# Patient Record
Sex: FEMALE | Race: WHITE | NOT HISPANIC OR LATINO | Employment: FULL TIME | URBAN - METROPOLITAN AREA
[De-identification: names, ages, dates, MRNs, and addresses within clinical notes are randomized per-mention and may not be internally consistent; named-entity substitution may affect disease eponyms.]

---

## 2017-04-14 ENCOUNTER — OFFICE VISIT (OUTPATIENT)
Dept: CARDIOLOGY | Facility: CLINIC | Age: 57
End: 2017-04-14

## 2017-04-14 VITALS
HEIGHT: 65 IN | DIASTOLIC BLOOD PRESSURE: 84 MMHG | OXYGEN SATURATION: 99 % | WEIGHT: 148 LBS | HEART RATE: 65 BPM | BODY MASS INDEX: 24.66 KG/M2 | SYSTOLIC BLOOD PRESSURE: 128 MMHG

## 2017-04-14 DIAGNOSIS — E78.5 HYPERLIPIDEMIA, UNSPECIFIED HYPERLIPIDEMIA TYPE: ICD-10-CM

## 2017-04-14 DIAGNOSIS — Z95.5 HISTORY OF CORONARY ARTERY STENT PLACEMENT: ICD-10-CM

## 2017-04-14 DIAGNOSIS — Z72.0 TOBACCO ABUSE: ICD-10-CM

## 2017-04-14 DIAGNOSIS — R01.1 HEART MURMUR: ICD-10-CM

## 2017-04-14 DIAGNOSIS — I25.10 CORONARY ARTERY DISEASE INVOLVING NATIVE CORONARY ARTERY OF NATIVE HEART WITHOUT ANGINA PECTORIS: Primary | ICD-10-CM

## 2017-04-14 PROCEDURE — 99214 OFFICE O/P EST MOD 30 MIN: CPT | Performed by: PHYSICIAN ASSISTANT

## 2017-04-14 PROCEDURE — 93000 ELECTROCARDIOGRAM COMPLETE: CPT | Performed by: PHYSICIAN ASSISTANT

## 2017-04-14 NOTE — PROGRESS NOTES
Date of Office Visit: 2017  Encounter Provider: BUCKY Mcdonald  Place of Service: [facility] CARDIOLOGY  Patient Name: Chiquita Hebert  :1960    Chief Complaint   Patient presents with   • Coronary Artery Disease     1 year follow up   :     HPI: Chiquita Hebert is a 56 y.o. female , new to me, who presents today for follow-up.  Old records have been obtained and reviewed by me.  She is a patient of Dr. Dodd's with a past medical history significant for coronary artery disease who is status post stenting of her proximal LAD.  She was last in our office to see Dr. Dodd on 2016.  Unfortunately, she was continuing to smoke at that visit.  She was still working for [employer] and was doing a lot of physical activity as part of her job.  She had no complaints of angina or heart failure.  No changes were made to her medical regimen at that visit.  Dr. Dodd recommended that she follow-up with her primary care physician about smoking cessation.  She is here today for her yearly follow-up.   Over the past year, she's been doing well.  She still works at [employer] and has no difficulty or physical limitations.  She does notice some shortness of breath on occasion as well as some palpitations.  This is unchanged for her.  Unfortunately, she is still smoking.  She used Chantix in the past, however she has been hesitant to restart it because it made her nauseous.        Past Medical History:   Diagnosis Date   • Anxiety    • Chest pain    • Constipation    • Fatigue    • Gastritis    • GERD (gastroesophageal reflux disease)    • H/O esophagogastroduodenoscopy 2010    Diagnostic    • Heart disease    • Heart murmur    • Heart palpitations    • Hyperlipidemia    • Hypertension        Past Surgical History:   Procedure Laterality Date   • ANGIOPLASTY      Cath Stent Placement.  Stenting of the LAD   • CARDIAC CATHETERIZATION     • CORONARY ANGIOPLASTY     • CORONARY STENT PLACEMENT      • UPPER GASTROINTESTINAL ENDOSCOPY         Social History     Social History   • Marital status: Single     Spouse name: N/A   • Number of children: N/A   • Years of education: N/A     Occupational History   • Not on file.     Social History Main Topics   • Smoking status: Current Every Day Smoker     Packs/day: 1.00   • Smokeless tobacco: Not on file   • Alcohol use 0.6 oz/week     1 Shots of liquor per week      Comment: OCCASIONAL   • Drug use: No   • Sexual activity: Defer     Other Topics Concern   • Not on file     Social History Narrative       Family History   Problem Relation Age of Onset   • Hypertension Mother    • Stroke Mother    • Thyroid disease Mother    • Heart disease Father    • Hypertension Father    • Cancer Sister    • Diabetes Sister    • No Known Problems Maternal Grandmother    • No Known Problems Maternal Grandfather    • No Known Problems Paternal Grandmother    • No Known Problems Paternal Grandfather    • Diabetes Sister    • No Known Problems Sister    • No Known Problems Sister        Review of Systems   Constitution: Negative for chills, fever, malaise/fatigue, weight gain and weight loss.   HENT: Negative for ear pain, headaches, hearing loss, nosebleeds and sore throat.    Eyes: Negative for double vision, pain and visual disturbance.   Cardiovascular: Positive for dyspnea on exertion and palpitations. Negative for chest pain, irregular heartbeat, leg swelling, near-syncope, orthopnea, paroxysmal nocturnal dyspnea and syncope.   Respiratory: Negative for cough, shortness of breath, sleep disturbances due to breathing, snoring and wheezing.    Endocrine: Negative for cold intolerance, heat intolerance and polyuria.   Skin: Negative for itching and rash.   Musculoskeletal: Negative for joint pain, joint swelling and myalgias.   Gastrointestinal: Negative for abdominal pain, diarrhea, melena, nausea and vomiting.   Genitourinary: Negative for frequency, hematuria and hesitancy.  "  Neurological: Negative for excessive daytime sleepiness, light-headedness, numbness, paresthesias and seizures.   Psychiatric/Behavioral: Negative for altered mental status and depression.   Allergic/Immunologic: Negative.    All other systems reviewed and are negative.      Allergies   Allergen Reactions   • Penicillins          Current Outpatient Prescriptions:   •  aspirin 81 MG tablet, Take 1 tablet by mouth daily., Disp: , Rfl:   •  clopidogrel (PLAVIX) 75 MG tablet, TAKE ONE TABLET BY MOUTH ONCE DAILY, Disp: 90 tablet, Rfl: 3  •  escitalopram (LEXAPRO) 20 MG tablet, Take 20 mg by mouth Daily., Disp: , Rfl:   •  gabapentin (NEURONTIN) 400 MG capsule, Take 1 capsule by mouth daily., Disp: , Rfl:   •  lisinopril (PRINIVIL,ZESTRIL) 5 MG tablet, Take 1 tablet by mouth daily., Disp: , Rfl:   •  magnesium oxide (MAG-OX) 400 MG tablet, Take 400 mg by mouth daily., Disp: , Rfl:   •  omeprazole (PriLOSEC) 20 MG capsule, Take 1 capsule by mouth daily., Disp: , Rfl:   •  polyethylene glycol (MIRALAX) packet, Take 17 g by mouth daily., Disp: 30 packet, Rfl: 3  •  simvastatin (ZOCOR) 40 MG tablet, Take 1 tablet by mouth daily., Disp: , Rfl:   •  vitamin D (ERGOCALCIFEROL) 56730 UNITS capsule capsule, 50,000 Units Every 7 (Seven) Days., Disp: , Rfl:      Objective:     Vitals:    04/14/17 1127 04/14/17 1128   BP: 126/82 128/84   BP Location: Right arm Left arm   Pulse: 65    SpO2: 99%    Weight: 148 lb (67.1 kg)    Height: 65\" (165.1 cm)      Body mass index is 24.63 kg/(m^2).    PHYSICAL EXAM:    Physical Exam   Constitutional: She is oriented to person, place, and time. She appears well-developed and well-nourished. No distress.   HENT:   Head: Normocephalic and atraumatic.   Eyes: Pupils are equal, round, and reactive to light.   Neck: No JVD present. No thyromegaly present.   Cardiovascular: Normal rate and intact distal pulses.  An irregular rhythm present.   Murmur heard.   Harsh midsystolic murmur is present with a " grade of 2/6  at the upper right sternal border radiating to the neck  Pulmonary/Chest: Effort normal and breath sounds normal. No respiratory distress.   Abdominal: Soft. Bowel sounds are normal. She exhibits no distension. There is no splenomegaly or hepatomegaly. There is no tenderness.   Musculoskeletal: Normal range of motion. She exhibits no edema.   Neurological: She is alert and oriented to person, place, and time.   Skin: Skin is warm and dry. She is not diaphoretic. No erythema.   Psychiatric: She has a normal mood and affect. Her behavior is normal. Judgment normal.         ECG 12 Lead  Date/Time: 4/14/2017 12:10 PM  Performed by: SIMEON GABRIEL.  Authorized by: SIMEON GABRIEL.   Comparison: compared with previous ECG from 2/8/2016  Similar to previous ECG  Rhythm: sinus rhythm  Ectopy: atrial premature contractions  BPM: 65  Clinical impression: abnormal ECG  Comments: Indication: Coronary artery disease, status post stent placement.    Ectopic atrial rhythm.              Assessment:       Diagnosis Plan   1. Coronary artery disease involving native coronary artery of native heart without angina pectoris  Adult Transthoracic Echo Complete    ECG 12 Lead   2. History of coronary artery stent placement  Adult Transthoracic Echo Complete    ECG 12 Lead   3. Heart murmur  Adult Transthoracic Echo Complete   4. Tobacco abuse     5. Hyperlipidemia, unspecified hyperlipidemia type       Orders Placed This Encounter   Procedures   • ECG 12 Lead     This order was created via procedure documentation   • Adult Transthoracic Echo Complete     Standing Status:   Future     Order Specific Question:   Reason for exam?     Answer:   Murmur or Click          Plan:       1.  Coronary Artery Disease  Assessment  • The patient has no angina    Plan  • Lifestyle modifications discussed include avoidance of tobacco products and regular exercise    Subjective - Objective  • There has been a previous stent procedure using  VILLA  • Current antiplatelet therapy includes aspirin 81 mg and clopidogrel 75 mg  • Overall she is stable from a cardiac standpoint.  I'm not make any changes to her medical regimen.  We did talk about smoking cessation, and I've encouraged her to follow up with her PCP about trying Chantix again.  I think that if she takes it with food and not on an empty stomach she may not be nauseous.    2.  Heart murmur.  I can't see that she's had an echocardiogram recently, going to check one.    3.  Hyperlipidemia.  She is on Zocor 40 mg, continue current medical regimen.    4.  Hypertension.  Her blood pressure is well-controlled today at 128/84.  Continue current medical regimen.    As always, it has been a pleasure to participate in your patient's care.      Sincerely,         Gavi Alcala PA-C

## 2017-05-01 ENCOUNTER — APPOINTMENT (OUTPATIENT)
Dept: CARDIOLOGY | Facility: HOSPITAL | Age: 57
End: 2017-05-01

## 2017-05-01 ENCOUNTER — HOSPITAL ENCOUNTER (OUTPATIENT)
Dept: CARDIOLOGY | Facility: HOSPITAL | Age: 57
Discharge: HOME | End: 2017-05-01
Admitting: PHYSICIAN ASSISTANT

## 2017-05-01 VITALS
HEIGHT: 65 IN | DIASTOLIC BLOOD PRESSURE: 70 MMHG | HEART RATE: 70 BPM | SYSTOLIC BLOOD PRESSURE: 112 MMHG | WEIGHT: 148 LBS | BODY MASS INDEX: 24.66 KG/M2

## 2017-05-01 DIAGNOSIS — Z95.5 HISTORY OF CORONARY ARTERY STENT PLACEMENT: ICD-10-CM

## 2017-05-01 DIAGNOSIS — R01.1 HEART MURMUR: ICD-10-CM

## 2017-05-01 DIAGNOSIS — I25.10 CORONARY ARTERY DISEASE INVOLVING NATIVE CORONARY ARTERY OF NATIVE HEART WITHOUT ANGINA PECTORIS: ICD-10-CM

## 2017-05-01 LAB
BH CV ECHO MEAS - ACS: 1.6 CM
BH CV ECHO MEAS - AO MAX PG (FULL): 9.4 MMHG
BH CV ECHO MEAS - AO MAX PG: 12.6 MMHG
BH CV ECHO MEAS - AO MEAN PG (FULL): 4.6 MMHG
BH CV ECHO MEAS - AO MEAN PG: 6.4 MMHG
BH CV ECHO MEAS - AO ROOT AREA (BSA CORRECTED): 1.4
BH CV ECHO MEAS - AO ROOT AREA: 4.5 CM^2
BH CV ECHO MEAS - AO ROOT DIAM: 2.4 CM
BH CV ECHO MEAS - AO V2 MAX: 177.8 CM/SEC
BH CV ECHO MEAS - AO V2 MEAN: 117.2 CM/SEC
BH CV ECHO MEAS - AO V2 VTI: 40.9 CM
BH CV ECHO MEAS - AVA(I,A): 1.3 CM^2
BH CV ECHO MEAS - AVA(I,D): 1.3 CM^2
BH CV ECHO MEAS - AVA(V,A): 1.3 CM^2
BH CV ECHO MEAS - AVA(V,D): 1.3 CM^2
BH CV ECHO MEAS - BSA(HAYCOCK): 1.8 M^2
BH CV ECHO MEAS - BSA: 1.7 M^2
BH CV ECHO MEAS - BZI_BMI: 24.6 KILOGRAMS/M^2
BH CV ECHO MEAS - BZI_METRIC_HEIGHT: 165.1 CM
BH CV ECHO MEAS - BZI_METRIC_WEIGHT: 67.1 KG
BH CV ECHO MEAS - CONTRAST EF (2CH): 58.8 ML/M^2
BH CV ECHO MEAS - CONTRAST EF 4CH: 58.9 ML/M^2
BH CV ECHO MEAS - EDV(MOD-SP2): 51 ML
BH CV ECHO MEAS - EDV(MOD-SP4): 56 ML
BH CV ECHO MEAS - EDV(TEICH): 103.6 ML
BH CV ECHO MEAS - EF(CUBED): 76.4 %
BH CV ECHO MEAS - EF(MOD-SP2): 58.8 %
BH CV ECHO MEAS - EF(MOD-SP4): 58.9 %
BH CV ECHO MEAS - EF(TEICH): 68.4 %
BH CV ECHO MEAS - ESV(MOD-SP2): 21 ML
BH CV ECHO MEAS - ESV(MOD-SP4): 23 ML
BH CV ECHO MEAS - ESV(TEICH): 32.7 ML
BH CV ECHO MEAS - FS: 38.2 %
BH CV ECHO MEAS - IVS/LVPW: 1
BH CV ECHO MEAS - IVSD: 1 CM
BH CV ECHO MEAS - LAT PEAK E' VEL: 10 CM/SEC
BH CV ECHO MEAS - LV DIASTOLIC VOL/BSA (35-75): 32.2 ML/M^2
BH CV ECHO MEAS - LV MASS(C)D: 161.2 GRAMS
BH CV ECHO MEAS - LV MASS(C)DI: 92.6 GRAMS/M^2
BH CV ECHO MEAS - LV MAX PG: 3.2 MMHG
BH CV ECHO MEAS - LV MEAN PG: 1.8 MMHG
BH CV ECHO MEAS - LV SYSTOLIC VOL/BSA (12-30): 13.2 ML/M^2
BH CV ECHO MEAS - LV V1 MAX: 89.7 CM/SEC
BH CV ECHO MEAS - LV V1 MEAN: 64.5 CM/SEC
BH CV ECHO MEAS - LV V1 VTI: 20.4 CM
BH CV ECHO MEAS - LVIDD: 4.7 CM
BH CV ECHO MEAS - LVIDS: 2.9 CM
BH CV ECHO MEAS - LVLD AP2: 7.1 CM
BH CV ECHO MEAS - LVLD AP4: 6.8 CM
BH CV ECHO MEAS - LVLS AP2: 6.1 CM
BH CV ECHO MEAS - LVLS AP4: 5.6 CM
BH CV ECHO MEAS - LVOT AREA (M): 2.5 CM^2
BH CV ECHO MEAS - LVOT AREA: 2.5 CM^2
BH CV ECHO MEAS - LVOT DIAM: 1.8 CM
BH CV ECHO MEAS - LVPWD: 0.96 CM
BH CV ECHO MEAS - MED PEAK E' VEL: 8 CM/SEC
BH CV ECHO MEAS - MV A DUR: 0.14 SEC
BH CV ECHO MEAS - MV A MAX VEL: 81 CM/SEC
BH CV ECHO MEAS - MV DEC SLOPE: 368.8 CM/SEC^2
BH CV ECHO MEAS - MV DEC TIME: 0.22 SEC
BH CV ECHO MEAS - MV E MAX VEL: 85.4 CM/SEC
BH CV ECHO MEAS - MV E/A: 1.1
BH CV ECHO MEAS - MV MAX PG: 4.4 MMHG
BH CV ECHO MEAS - MV MEAN PG: 2.3 MMHG
BH CV ECHO MEAS - MV P1/2T MAX VEL: 85.4 CM/SEC
BH CV ECHO MEAS - MV P1/2T: 67.8 MSEC
BH CV ECHO MEAS - MV V2 MAX: 104.5 CM/SEC
BH CV ECHO MEAS - MV V2 MEAN: 71.5 CM/SEC
BH CV ECHO MEAS - MV V2 VTI: 35.3 CM
BH CV ECHO MEAS - MVA P1/2T LCG: 2.6 CM^2
BH CV ECHO MEAS - MVA(P1/2T): 3.2 CM^2
BH CV ECHO MEAS - MVA(VTI): 1.5 CM^2
BH CV ECHO MEAS - PA MAX PG (FULL): 12.2 MMHG
BH CV ECHO MEAS - PA MAX PG: 14.7 MMHG
BH CV ECHO MEAS - PA V2 MAX: 191.4 CM/SEC
BH CV ECHO MEAS - PULM A REVS DUR: 0.09 SEC
BH CV ECHO MEAS - PULM A REVS VEL: 31 CM/SEC
BH CV ECHO MEAS - PULM DIAS VEL: 72.3 CM/SEC
BH CV ECHO MEAS - PULM S/D: 1.1
BH CV ECHO MEAS - PULM SYS VEL: 78.6 CM/SEC
BH CV ECHO MEAS - PVA(V,A): 1.2 CM^2
BH CV ECHO MEAS - PVA(V,D): 1.2 CM^2
BH CV ECHO MEAS - QP/QS: 0.99
BH CV ECHO MEAS - RAP SYSTOLE: 3 MMHG
BH CV ECHO MEAS - RV MAX PG: 2.5 MMHG
BH CV ECHO MEAS - RV MEAN PG: 1.4 MMHG
BH CV ECHO MEAS - RV V1 MAX: 79.1 CM/SEC
BH CV ECHO MEAS - RV V1 MEAN: 53.7 CM/SEC
BH CV ECHO MEAS - RV V1 VTI: 18 CM
BH CV ECHO MEAS - RVOT AREA: 2.9 CM^2
BH CV ECHO MEAS - RVOT DIAM: 1.9 CM
BH CV ECHO MEAS - RVSP: 13 MMHG
BH CV ECHO MEAS - SI(AO): 106 ML/M^2
BH CV ECHO MEAS - SI(CUBED): 46.3 ML/M^2
BH CV ECHO MEAS - SI(LVOT): 29.9 ML/M^2
BH CV ECHO MEAS - SI(MOD-SP2): 17.2 ML/M^2
BH CV ECHO MEAS - SI(MOD-SP4): 19 ML/M^2
BH CV ECHO MEAS - SI(TEICH): 40.7 ML/M^2
BH CV ECHO MEAS - SUP REN AO DIAM: 1.8 CM
BH CV ECHO MEAS - SV(AO): 184.5 ML
BH CV ECHO MEAS - SV(CUBED): 80.6 ML
BH CV ECHO MEAS - SV(LVOT): 52 ML
BH CV ECHO MEAS - SV(MOD-SP2): 30 ML
BH CV ECHO MEAS - SV(MOD-SP4): 33 ML
BH CV ECHO MEAS - SV(RVOT): 51.6 ML
BH CV ECHO MEAS - SV(TEICH): 70.9 ML
BH CV ECHO MEAS - TAPSE (>1.6): 1.6 CM2
BH CV ECHO MEAS - TR MAX VEL: 157.5 CM/SEC
BH CV XLRA - RV BASE: 3.2 CM
BH CV XLRA - TDI S': 14 CM/SEC
E/E' RATIO: 9
LEFT ATRIUM VOLUME INDEX: 21 ML/M2
LV EF 2D ECHO EST: 59 %

## 2017-05-01 PROCEDURE — 93306 TTE W/DOPPLER COMPLETE: CPT | Performed by: INTERNAL MEDICINE

## 2017-05-01 PROCEDURE — 93306 TTE W/DOPPLER COMPLETE: CPT

## 2017-05-02 ENCOUNTER — TELEPHONE (OUTPATIENT)
Dept: CARDIOLOGY | Facility: CLINIC | Age: 57
End: 2017-05-02

## 2017-05-18 ENCOUNTER — OFFICE VISIT (OUTPATIENT)
Dept: GASTROENTEROLOGY | Facility: CLINIC | Age: 57
End: 2017-05-18

## 2017-05-18 VITALS
BODY MASS INDEX: 24.49 KG/M2 | SYSTOLIC BLOOD PRESSURE: 120 MMHG | HEIGHT: 65 IN | WEIGHT: 147 LBS | DIASTOLIC BLOOD PRESSURE: 78 MMHG

## 2017-05-18 DIAGNOSIS — R11.0 NAUSEA: Primary | ICD-10-CM

## 2017-05-18 DIAGNOSIS — K21.9 GASTROESOPHAGEAL REFLUX DISEASE WITHOUT ESOPHAGITIS: ICD-10-CM

## 2017-05-18 PROCEDURE — 99213 OFFICE O/P EST LOW 20 MIN: CPT | Performed by: INTERNAL MEDICINE

## 2017-05-18 RX ORDER — SUCRALFATE ORAL 1 G/10ML
1 SUSPENSION ORAL 4 TIMES DAILY
Qty: 420 ML | Refills: 1 | Status: SHIPPED | OUTPATIENT
Start: 2017-05-18 | End: 2017-12-26

## 2017-11-01 ENCOUNTER — OFFICE VISIT (OUTPATIENT)
Dept: GASTROENTEROLOGY | Facility: CLINIC | Age: 57
End: 2017-11-01

## 2017-11-01 VITALS — WEIGHT: 144.6 LBS | HEIGHT: 65 IN | BODY MASS INDEX: 24.09 KG/M2

## 2017-11-01 DIAGNOSIS — R19.7 DIARRHEA, UNSPECIFIED TYPE: Primary | ICD-10-CM

## 2017-11-01 DIAGNOSIS — R11.2 NAUSEA AND VOMITING, INTRACTABILITY OF VOMITING NOT SPECIFIED, UNSPECIFIED VOMITING TYPE: ICD-10-CM

## 2017-11-01 DIAGNOSIS — K21.9 GASTROESOPHAGEAL REFLUX DISEASE, ESOPHAGITIS PRESENCE NOT SPECIFIED: ICD-10-CM

## 2017-11-01 DIAGNOSIS — R10.84 GENERALIZED ABDOMINAL PAIN: ICD-10-CM

## 2017-11-01 PROCEDURE — 99213 OFFICE O/P EST LOW 20 MIN: CPT | Performed by: INTERNAL MEDICINE

## 2017-11-01 RX ORDER — DICYCLOMINE HCL 20 MG
20 TABLET ORAL EVERY 6 HOURS
Qty: 60 TABLET | Refills: 4 | Status: SHIPPED | OUTPATIENT
Start: 2017-11-01 | End: 2018-04-23 | Stop reason: ALTCHOICE

## 2017-11-01 NOTE — PROGRESS NOTES
PATIENT INFORMATION  Chiquita Hebert       - 1960    CHIEF COMPLAINT  Chief Complaint   Patient presents with   • Abdominal Pain   • Diarrhea   • Nausea   • Vomiting   • Heartburn       HISTORY OF PRESENT ILLNESS  Diarrhea    Associated symptoms include abdominal pain, coughing and vomiting.   Vomiting    Associated symptoms include abdominal pain, coughing and diarrhea.   Heartburn   She complains of abdominal pain, coughing, nausea and wheezing.   Abdominal Pain   Associated symptoms include diarrhea, frequency, nausea and vomiting. Her past medical history is significant for GERD.           REVIEW OF SYSTEMS  Review of Systems   HENT: Positive for congestion, rhinorrhea and sneezing.    Respiratory: Positive for cough, wheezing and stridor.    Gastrointestinal: Positive for abdominal pain, diarrhea, nausea and vomiting.        Reflux   Endocrine: Positive for polydipsia and polyuria.   Genitourinary: Positive for frequency and urgency.   Musculoskeletal: Positive for back pain.   Psychiatric/Behavioral:        Depression   All other systems reviewed and are negative.        ACTIVE PROBLEMS  Patient Active Problem List    Diagnosis   • History of coronary artery stent placement [Z95.5]   • Coronary artery disease involving native coronary artery of native heart without angina pectoris [I25.10]   • Hyperlipidemia [E78.5]   • Tobacco abuse [Z72.0]   • Heart murmur [R01.1]   • Chronic constipation [K59.09]   • Rectal pain [K62.89]   • Trigger middle finger of right hand [M65.331]         PAST MEDICAL HISTORY  Past Medical History:   Diagnosis Date   • Anxiety    • Chest pain    • Constipation    • Fatigue    • Gastritis    • GERD (gastroesophageal reflux disease)    • H/O esophagogastroduodenoscopy 2010    Diagnostic    • Heart disease    • Heart murmur    • Heart palpitations    • Hyperlipidemia    • Hypertension          SURGICAL HISTORY  Past Surgical History:   Procedure Laterality Date   •  ANGIOPLASTY      Cath Stent Placement.  Stenting of the LAD   • CARDIAC CATHETERIZATION     • CORONARY ANGIOPLASTY     • CORONARY STENT PLACEMENT     • UPPER GASTROINTESTINAL ENDOSCOPY           FAMILY HISTORY  Family History   Problem Relation Age of Onset   • Hypertension Mother    • Stroke Mother    • Thyroid disease Mother    • Heart disease Father    • Hypertension Father    • Cancer Sister    • Diabetes Sister    • No Known Problems Maternal Grandmother    • No Known Problems Maternal Grandfather    • No Known Problems Paternal Grandmother    • No Known Problems Paternal Grandfather    • Diabetes Sister    • No Known Problems Sister    • No Known Problems Sister          SOCIAL HISTORY  Social History     Occupational History   • Not on file.     Social History Main Topics   • Smoking status: Current Every Day Smoker     Packs/day: 1.00   • Smokeless tobacco: Not on file   • Alcohol use 0.6 oz/week     1 Shots of liquor per week      Comment: OCCASIONAL   • Drug use: No   • Sexual activity: Defer         CURRENT MEDICATIONS    Current Outpatient Prescriptions:   •  aspirin 81 MG tablet, Take 1 tablet by mouth daily., Disp: , Rfl:   •  clopidogrel (PLAVIX) 75 MG tablet, TAKE ONE TABLET BY MOUTH ONCE DAILY, Disp: 90 tablet, Rfl: 3  •  dicyclomine (BENTYL) 20 MG tablet, Take 1 tablet by mouth Every 6 (Six) Hours., Disp: 60 tablet, Rfl: 4  •  escitalopram (LEXAPRO) 20 MG tablet, Take 20 mg by mouth Daily., Disp: , Rfl:   •  gabapentin (NEURONTIN) 400 MG capsule, Take 1 capsule by mouth daily., Disp: , Rfl:   •  lisinopril (PRINIVIL,ZESTRIL) 5 MG tablet, Take 1 tablet by mouth daily., Disp: , Rfl:   •  magnesium oxide (MAG-OX) 400 MG tablet, Take 400 mg by mouth daily., Disp: , Rfl:   •  omeprazole (PriLOSEC) 20 MG capsule, Take 1 capsule by mouth daily., Disp: , Rfl:   •  polyethylene glycol (MIRALAX) packet, Take 17 g by mouth daily., Disp: 30 packet, Rfl: 3  •  simvastatin (ZOCOR) 40 MG tablet, Take 1 tablet by  "mouth daily., Disp: , Rfl:   •  sucralfate (CARAFATE) 1 GM/10ML suspension, Take 10 mL by mouth 4 (Four) Times a Day., Disp: 420 mL, Rfl: 1  •  vitamin D (ERGOCALCIFEROL) 52351 UNITS capsule capsule, 50,000 Units Every 7 (Seven) Days., Disp: , Rfl:     ALLERGIES  Penicillins    VITALS  Vitals:    11/01/17 1405   Weight: 144 lb 9.6 oz (65.6 kg)   Height: 65\" (165.1 cm)       LAST RESULTS   Hospital Outpatient Visit on 05/01/2017   Component Date Value Ref Range Status   • TDI S' 05/01/2017 14.00  cm/sec Final   • RV Base 05/01/2017 3.20  cm Final   • E/E' ratio 05/01/2017 9.0   Final   • LA Volume Index 05/01/2017 21.0  mL/m2 Final   • Lat Peak E' Gerson 05/01/2017 10.0  cm/sec Final   • Med Peak E' Gerson 05/01/2017 8.00  cm/sec Final   • RAP systole 05/01/2017 3  mmHg Final   • RVSP(TR) 05/01/2017 13  mmHg Final   • Sup Raz Ao Diam 05/01/2017 1.80  cm Final   • TAPSE (>1.6) 05/01/2017 1.60  cm2 Final   • BSA 05/01/2017 1.7  m^2 Preliminary   • BH CV ECHO SATISH - BZI_BMI 05/01/2017 24.6  kilograms/m^2 Preliminary   •  CV ECHO SATISH - BSA([place]OCK) 05/01/2017 1.8  m^2 Preliminary   • BH CV ECHO SATISH - BZI_METRIC_WEIGHT 05/01/2017 67.1  kg Preliminary   •  CV ECHO SATISH - BZI_METRIC_HEIGHT 05/01/2017 165.1  cm Preliminary   • IVSd 05/01/2017 1.0  cm Preliminary   • LVIDd 05/01/2017 4.7  cm Preliminary   • LVIDs 05/01/2017 2.9  cm Preliminary   • LVPWd 05/01/2017 0.96  cm Preliminary   • IVS/LVPW 05/01/2017 1.0   Preliminary   • FS 05/01/2017 38.2  % Preliminary   • EDV(Teich) 05/01/2017 103.6  ml Preliminary   • ESV(Teich) 05/01/2017 32.7  ml Preliminary   • EF(Teich) 05/01/2017 68.4  % Preliminary   • EF(cubed) 05/01/2017 76.4  % Preliminary   • LV mass(C)d 05/01/2017 161.2  grams Preliminary   • LV mass(C)dI 05/01/2017 92.6  grams/m^2 Preliminary   • SV(Teich) 05/01/2017 70.9  ml Preliminary   • SI(Teich) 05/01/2017 40.7  ml/m^2 Preliminary   • SV(cubed) 05/01/2017 80.6  ml Preliminary   • SI(cubed) 05/01/2017 46.3  " ml/m^2 Preliminary   • Ao root diam 05/01/2017 2.4  cm Preliminary   • Ao root area 05/01/2017 4.5  cm^2 Preliminary   • ACS 05/01/2017 1.6  cm Preliminary   • LVOT diam 05/01/2017 1.8  cm Preliminary   • LVOT area 05/01/2017 2.5  cm^2 Preliminary   • LVOT area(traced) 05/01/2017 2.5  cm^2 Preliminary   • RVOT diam 05/01/2017 1.9  cm Preliminary   • RVOT area 05/01/2017 2.9  cm^2 Preliminary   • LVLd ap4 05/01/2017 6.8  cm Preliminary   • EDV(MOD-sp4) 05/01/2017 56.0  ml Preliminary   • LVLs ap4 05/01/2017 5.6  cm Preliminary   • ESV(MOD-sp4) 05/01/2017 23.0  ml Preliminary   • EF(MOD-sp4) 05/01/2017 58.9  % Preliminary   • LVLd ap2 05/01/2017 7.1  cm Preliminary   • EDV(MOD-sp2) 05/01/2017 51.0  ml Preliminary   • LVLs ap2 05/01/2017 6.1  cm Preliminary   • ESV(MOD-sp2) 05/01/2017 21.0  ml Preliminary   • EF(MOD-sp2) 05/01/2017 58.8  % Preliminary   • SV(MOD-sp4) 05/01/2017 33.0  ml Preliminary   • SI(MOD-sp4) 05/01/2017 19.0  ml/m^2 Preliminary   • SV(MOD-sp2) 05/01/2017 30.0  ml Preliminary   • SI(MOD-sp2) 05/01/2017 17.2  ml/m^2 Preliminary   • Ao root area (BSA corrected) 05/01/2017 1.4   Preliminary   • Ao root area (BSA corrected) 05/01/2017 58.8  ml/m^2 Preliminary   • CONTRAST EF 4CH 05/01/2017 58.9  ml/m^2 Preliminary   • LV Diastolic corrected for BSA 05/01/2017 32.2  ml/m^2 Preliminary   • LV Systolic corrected for BSA 05/01/2017 13.2  ml/m^2 Preliminary   • MV A dur 05/01/2017 0.14  sec Preliminary   • MV E max haile 05/01/2017 85.4  cm/sec Preliminary   • MV A max haile 05/01/2017 81.0  cm/sec Preliminary   • MV E/A 05/01/2017 1.1   Preliminary   • MV V2 max 05/01/2017 104.5  cm/sec Preliminary   • MV max PG 05/01/2017 4.4  mmHg Preliminary   • MV V2 mean 05/01/2017 71.5  cm/sec Preliminary   • MV mean PG 05/01/2017 2.3  mmHg Preliminary   • MV V2 VTI 05/01/2017 35.3  cm Preliminary   • MVA(VTI) 05/01/2017 1.5  cm^2 Preliminary   • MV P1/2t max haile 05/01/2017 85.4  cm/sec Preliminary   • MV P1/2t  05/01/2017 67.8  msec Preliminary   • MVA(P1/2t) 05/01/2017 3.2  cm^2 Preliminary   • MV dec slope 05/01/2017 368.8  cm/sec^2 Preliminary   • MV dec time 05/01/2017 0.22  sec Preliminary   • Ao pk gerson 05/01/2017 177.8  cm/sec Preliminary   • Ao max PG 05/01/2017 12.6  mmHg Preliminary   • Ao max PG (full) 05/01/2017 9.4  mmHg Preliminary   • Ao V2 mean 05/01/2017 117.2  cm/sec Preliminary   • Ao mean PG 05/01/2017 6.4  mmHg Preliminary   • Ao mean PG (full) 05/01/2017 4.6  mmHg Preliminary   • Ao V2 VTI 05/01/2017 40.9  cm Preliminary   • ROXANA(I,A) 05/01/2017 1.3  cm^2 Preliminary   • ROXANA(I,D) 05/01/2017 1.3  cm^2 Preliminary   • ROXANA(V,A) 05/01/2017 1.3  cm^2 Preliminary   • ROXANA(V,D) 05/01/2017 1.3  cm^2 Preliminary   • LV V1 max PG 05/01/2017 3.2  mmHg Preliminary   • LV V1 mean PG 05/01/2017 1.8  mmHg Preliminary   • LV V1 max 05/01/2017 89.7  cm/sec Preliminary   • LV V1 mean 05/01/2017 64.5  cm/sec Preliminary   • LV V1 VTI 05/01/2017 20.4  cm Preliminary   • SV(Ao) 05/01/2017 184.5  ml Preliminary   • SI(Ao) 05/01/2017 106.0  ml/m^2 Preliminary   • SV(LVOT) 05/01/2017 52.0  ml Preliminary   • SV(RVOT) 05/01/2017 51.6  ml Preliminary   • SI(LVOT) 05/01/2017 29.9  ml/m^2 Preliminary   • PA V2 max 05/01/2017 191.4  cm/sec Preliminary   • PA max PG 05/01/2017 14.7  mmHg Preliminary   • PA max PG (full) 05/01/2017 12.2  mmHg Preliminary   • BH CV ECHO SATISH - PVA(V,A) 05/01/2017 1.2  cm^2 Preliminary   • BH CV ECHO SATISH - PVA(V,D) 05/01/2017 1.2  cm^2 Preliminary   • RV V1 max PG 05/01/2017 2.5  mmHg Preliminary   • RV V1 mean PG 05/01/2017 1.4  mmHg Preliminary   • RV V1 max 05/01/2017 79.1  cm/sec Preliminary   • RV V1 mean 05/01/2017 53.7  cm/sec Preliminary   • RV V1 VTI 05/01/2017 18.0  cm Preliminary   • TR max gerson 05/01/2017 157.5  cm/sec Preliminary   • Pulm Sys Gerson 05/01/2017 78.6  cm/sec Preliminary   • Pulm Posada Gerson 05/01/2017 72.3  cm/sec Preliminary   • Pulm S/D 05/01/2017 1.1   Preliminary   • Qp/Qs  05/01/2017 0.99   Preliminary   • Pulm A Revs Dur 05/01/2017 0.09  sec Preliminary   • Pulm A Revs Gerson 05/01/2017 31.0  cm/sec Preliminary   • MVA P1/2T LCG 05/01/2017 2.6  cm^2 Preliminary   • Echo EF Estimated 05/01/2017 59  % Final     No results found.    PHYSICAL EXAM  Physical Exam   Constitutional: She is oriented to person, place, and time. She appears well-developed and well-nourished. No distress.   HENT:   Head: Normocephalic and atraumatic.   Mouth/Throat: Oropharynx is clear and moist.   Eyes: EOM are normal. Pupils are equal, round, and reactive to light.   Neck: Normal range of motion. No tracheal deviation present.   Cardiovascular: Normal rate, regular rhythm, normal heart sounds and intact distal pulses.  Exam reveals no gallop and no friction rub.    No murmur heard.  Pulmonary/Chest: Effort normal and breath sounds normal. No stridor. No respiratory distress. She has no wheezes. She has no rales. She exhibits no tenderness.   Abdominal: Soft. Bowel sounds are normal. She exhibits no distension. There is tenderness. There is no rebound and no guarding.   Mild diffuse tenderness, no rebound or guarding   Musculoskeletal: She exhibits no edema.   Lymphadenopathy:     She has no cervical adenopathy.   Neurological: She is alert and oriented to person, place, and time.   Skin: Skin is warm. She is not diaphoretic.   Psychiatric: She has a normal mood and affect. Her behavior is normal. Judgment and thought content normal.   Nursing note and vitals reviewed.      ASSESSMENT  Diagnoses and all orders for this visit:    Diarrhea, unspecified type  -     Clostridium Difficile Toxin - Stool, Per Rectum  -     Fecal Leukocytes - Stool, Per Rectum    Nausea and vomiting, intractability of vomiting not specified, unspecified vomiting type  -     Clostridium Difficile Toxin - Stool, Per Rectum  -     Fecal Leukocytes - Stool, Per Rectum    Gastroesophageal reflux disease, esophagitis presence not specified  -      Fecal Leukocytes - Stool, Per Rectum    Generalized abdominal pain  -     Fecal Leukocytes - Stool, Per Rectum    Other orders  -     dicyclomine (BENTYL) 20 MG tablet; Take 1 tablet by mouth Every 6 (Six) Hours.          PLAN  No Follow-up on file.      Trial of bentyl and check stool c.diff. If not better, plan cls.

## 2017-11-02 ENCOUNTER — LAB (OUTPATIENT)
Dept: LAB | Facility: HOSPITAL | Age: 57
End: 2017-11-02

## 2017-11-02 DIAGNOSIS — M65.331 TRIGGER MIDDLE FINGER OF RIGHT HAND: ICD-10-CM

## 2017-11-02 DIAGNOSIS — Z95.5 HISTORY OF CORONARY ARTERY STENT PLACEMENT: ICD-10-CM

## 2017-11-02 DIAGNOSIS — K62.89 RECTAL PAIN: ICD-10-CM

## 2017-11-02 DIAGNOSIS — K59.09 CHRONIC CONSTIPATION: Primary | ICD-10-CM

## 2017-11-02 PROCEDURE — 87493 C DIFF AMPLIFIED PROBE: CPT | Performed by: INTERNAL MEDICINE

## 2017-11-02 PROCEDURE — 83631 LACTOFERRIN FECAL (QUANT): CPT

## 2017-11-03 LAB
C DIFF TOX GENS STL QL NAA+PROBE: NEGATIVE
LACTOFERRIN STL QL LA: NEGATIVE

## 2017-11-09 RX ORDER — CLOPIDOGREL BISULFATE 75 MG/1
TABLET ORAL
Qty: 90 TABLET | Refills: 3 | Status: SHIPPED | OUTPATIENT
Start: 2017-11-09 | End: 2018-11-20 | Stop reason: SDUPTHER

## 2017-11-29 ENCOUNTER — TRANSCRIBE ORDERS (OUTPATIENT)
Dept: ADMINISTRATIVE | Facility: HOSPITAL | Age: 57
End: 2017-11-29

## 2017-11-29 DIAGNOSIS — R10.83 ABDOMINAL COLIC: Primary | ICD-10-CM

## 2017-12-01 ENCOUNTER — TELEPHONE (OUTPATIENT)
Dept: SURGERY | Facility: CLINIC | Age: 57
End: 2017-12-01

## 2017-12-01 DIAGNOSIS — R10.9 ABDOMINAL PAIN, UNSPECIFIED ABDOMINAL LOCATION: Primary | ICD-10-CM

## 2017-12-01 DIAGNOSIS — R19.7 DIARRHEA, UNSPECIFIED TYPE: ICD-10-CM

## 2017-12-01 NOTE — TELEPHONE ENCOUNTER
Okay to schedule colonoscopy per Dr. Singh. See labs.    Patient has been scheduled on 12/27/17 to arrive at 1:30. Instructions have been mailed to them.

## 2017-12-04 ENCOUNTER — HOSPITAL ENCOUNTER (OUTPATIENT)
Dept: ULTRASOUND IMAGING | Facility: HOSPITAL | Age: 57
Discharge: HOME | End: 2017-12-04
Attending: FAMILY MEDICINE | Admitting: FAMILY MEDICINE

## 2017-12-04 DIAGNOSIS — R10.83 ABDOMINAL COLIC: ICD-10-CM

## 2017-12-04 PROCEDURE — 76705 ECHO EXAM OF ABDOMEN: CPT

## 2017-12-26 ENCOUNTER — ANESTHESIA EVENT (OUTPATIENT)
Dept: PERIOP | Facility: HOSPITAL | Age: 57
End: 2017-12-26

## 2017-12-27 ENCOUNTER — HOSPITAL ENCOUNTER (OUTPATIENT)
Facility: HOSPITAL | Age: 57
Setting detail: HOSPITAL OUTPATIENT SURGERY
Discharge: HOME | End: 2017-12-27
Attending: INTERNAL MEDICINE | Admitting: INTERNAL MEDICINE

## 2017-12-27 ENCOUNTER — ANESTHESIA (OUTPATIENT)
Dept: PERIOP | Facility: HOSPITAL | Age: 57
End: 2017-12-27

## 2017-12-27 VITALS
HEART RATE: 56 BPM | WEIGHT: 139.8 LBS | HEIGHT: 65 IN | RESPIRATION RATE: 16 BRPM | BODY MASS INDEX: 23.29 KG/M2 | SYSTOLIC BLOOD PRESSURE: 117 MMHG | OXYGEN SATURATION: 97 % | TEMPERATURE: 97.8 F | DIASTOLIC BLOOD PRESSURE: 53 MMHG

## 2017-12-27 DIAGNOSIS — R19.7 DIARRHEA, UNSPECIFIED TYPE: ICD-10-CM

## 2017-12-27 DIAGNOSIS — R10.9 ABDOMINAL PAIN, UNSPECIFIED ABDOMINAL LOCATION: ICD-10-CM

## 2017-12-27 DIAGNOSIS — R10.84 GENERALIZED ABDOMINAL PAIN: Primary | ICD-10-CM

## 2017-12-27 PROCEDURE — 45380 COLONOSCOPY AND BIOPSY: CPT | Performed by: INTERNAL MEDICINE

## 2017-12-27 PROCEDURE — 25010000002 PROPOFOL 10 MG/ML EMULSION: Performed by: NURSE ANESTHETIST, CERTIFIED REGISTERED

## 2017-12-27 RX ORDER — SODIUM CHLORIDE, SODIUM LACTATE, POTASSIUM CHLORIDE, CALCIUM CHLORIDE 600; 310; 30; 20 MG/100ML; MG/100ML; MG/100ML; MG/100ML
1000 INJECTION, SOLUTION INTRAVENOUS CONTINUOUS PRN
Status: DISCONTINUED | OUTPATIENT
Start: 2017-12-27 | End: 2017-12-27 | Stop reason: HOSPADM

## 2017-12-27 RX ORDER — LIDOCAINE HYDROCHLORIDE 20 MG/ML
INJECTION, SOLUTION INFILTRATION; PERINEURAL AS NEEDED
Status: DISCONTINUED | OUTPATIENT
Start: 2017-12-27 | End: 2017-12-27 | Stop reason: SURG

## 2017-12-27 RX ORDER — PROPOFOL 10 MG/ML
VIAL (ML) INTRAVENOUS AS NEEDED
Status: DISCONTINUED | OUTPATIENT
Start: 2017-12-27 | End: 2017-12-27 | Stop reason: SURG

## 2017-12-27 RX ORDER — MAGNESIUM HYDROXIDE 1200 MG/15ML
LIQUID ORAL AS NEEDED
Status: DISCONTINUED | OUTPATIENT
Start: 2017-12-27 | End: 2017-12-27 | Stop reason: HOSPADM

## 2017-12-27 RX ORDER — ONDANSETRON 2 MG/ML
4 INJECTION INTRAMUSCULAR; INTRAVENOUS ONCE AS NEEDED
Status: CANCELLED | OUTPATIENT
Start: 2017-12-27

## 2017-12-27 RX ORDER — SODIUM CHLORIDE, SODIUM LACTATE, POTASSIUM CHLORIDE, CALCIUM CHLORIDE 600; 310; 30; 20 MG/100ML; MG/100ML; MG/100ML; MG/100ML
100 INJECTION, SOLUTION INTRAVENOUS CONTINUOUS
Status: CANCELLED | OUTPATIENT
Start: 2017-12-27

## 2017-12-27 RX ORDER — LIDOCAINE HYDROCHLORIDE 10 MG/ML
0.5 INJECTION, SOLUTION INFILTRATION; PERINEURAL ONCE AS NEEDED
Status: COMPLETED | OUTPATIENT
Start: 2017-12-27 | End: 2017-12-27

## 2017-12-27 RX ORDER — SODIUM CHLORIDE 0.9 % (FLUSH) 0.9 %
3 SYRINGE (ML) INJECTION AS NEEDED
Status: DISCONTINUED | OUTPATIENT
Start: 2017-12-27 | End: 2017-12-27 | Stop reason: HOSPADM

## 2017-12-27 RX ADMIN — PROPOFOL 40 MG: 10 INJECTION, EMULSION INTRAVENOUS at 11:44

## 2017-12-27 RX ADMIN — PROPOFOL 40 MG: 10 INJECTION, EMULSION INTRAVENOUS at 12:02

## 2017-12-27 RX ADMIN — PROPOFOL 50 MG: 10 INJECTION, EMULSION INTRAVENOUS at 11:57

## 2017-12-27 RX ADMIN — PROPOFOL 40 MG: 10 INJECTION, EMULSION INTRAVENOUS at 12:05

## 2017-12-27 RX ADMIN — PROPOFOL 40 MG: 10 INJECTION, EMULSION INTRAVENOUS at 11:35

## 2017-12-27 RX ADMIN — SODIUM CHLORIDE, POTASSIUM CHLORIDE, SODIUM LACTATE AND CALCIUM CHLORIDE: 600; 310; 30; 20 INJECTION, SOLUTION INTRAVENOUS at 10:57

## 2017-12-27 RX ADMIN — PROPOFOL 40 MG: 10 INJECTION, EMULSION INTRAVENOUS at 11:28

## 2017-12-27 RX ADMIN — PROPOFOL 40 MG: 10 INJECTION, EMULSION INTRAVENOUS at 11:30

## 2017-12-27 RX ADMIN — PROPOFOL 40 MG: 10 INJECTION, EMULSION INTRAVENOUS at 11:38

## 2017-12-27 RX ADMIN — LIDOCAINE HYDROCHLORIDE 0.1 ML: 10 INJECTION, SOLUTION EPIDURAL; INFILTRATION; INTRACAUDAL; PERINEURAL at 09:45

## 2017-12-27 RX ADMIN — SODIUM CHLORIDE, POTASSIUM CHLORIDE, SODIUM LACTATE AND CALCIUM CHLORIDE 1000 ML: 600; 310; 30; 20 INJECTION, SOLUTION INTRAVENOUS at 09:45

## 2017-12-27 RX ADMIN — PROPOFOL 40 MG: 10 INJECTION, EMULSION INTRAVENOUS at 11:48

## 2017-12-27 RX ADMIN — PROPOFOL 40 MG: 10 INJECTION, EMULSION INTRAVENOUS at 12:08

## 2017-12-27 RX ADMIN — PROPOFOL 40 MG: 10 INJECTION, EMULSION INTRAVENOUS at 11:32

## 2017-12-27 RX ADMIN — PROPOFOL 40 MG: 10 INJECTION, EMULSION INTRAVENOUS at 11:41

## 2017-12-27 RX ADMIN — PROPOFOL 40 MG: 10 INJECTION, EMULSION INTRAVENOUS at 11:50

## 2017-12-27 RX ADMIN — PROPOFOL 40 MG: 10 INJECTION, EMULSION INTRAVENOUS at 11:53

## 2017-12-27 RX ADMIN — PROPOFOL 40 MG: 10 INJECTION, EMULSION INTRAVENOUS at 11:55

## 2017-12-27 RX ADMIN — PROPOFOL 40 MG: 10 INJECTION, EMULSION INTRAVENOUS at 12:00

## 2017-12-27 RX ADMIN — LIDOCAINE HYDROCHLORIDE 100 MG: 20 INJECTION, SOLUTION INFILTRATION; PERINEURAL at 11:28

## 2017-12-27 RX ADMIN — PROPOFOL 40 MG: 10 INJECTION, EMULSION INTRAVENOUS at 11:46

## 2017-12-27 NOTE — ANESTHESIA PREPROCEDURE EVALUATION
Anesthesia Evaluation     Patient summary reviewed   no history of anesthetic complications:  NPO Solid Status: > 8 hours  NPO Liquid Status: > 4 hours     Airway   Mallampati: II  TM distance: >3 FB  Neck ROM: full  no difficulty expected  Dental    (+) upper dentures    Pulmonary - normal exam    breath sounds clear to auscultation  (+) a smoker (30 pck) Current,   Recent URI: allergy, drainage, non-prod cough.  Cardiovascular - normal exam  Exercise tolerance: good (4-7 METS)    Rhythm: regular  Rate: normal    (+) hypertension well controlled, cardiac stents (10 yrs ago, no prob since) more than 12 months ago   CAD: sees cardio every year.      Neuro/Psych  GI/Hepatic/Renal/Endo    (+)  GERD well controlled,     Musculoskeletal (-) negative ROS    Abdominal  - normal exam   Substance History - negative use     OB/GYN          Other - negative ROS                                               Anesthesia Plan    ASA 2     MAC     intravenous induction   Anesthetic plan and risks discussed with patient.

## 2017-12-27 NOTE — ANESTHESIA POSTPROCEDURE EVALUATION
Patient: Chiquita Hebert    Procedure Summary     Date Anesthesia Start Anesthesia Stop Room / Location    12/27/17 1127 1210 [facility] / [facility]       Procedure Diagnosis Surgeon Provider    COLONOSCOPY WITH BIOPSIES; POLYPECTOMY (N/A ) Abdominal pain, unspecified abdominal location; Diarrhea, unspecified type  (Abdominal pain, unspecified abdominal location [R10.9]; Diarrhea, unspecified type [R19.7]) MD Janice Delgado CRNA          Anesthesia Type: MAC  Last vitals  BP   120/71 (12/27/17 0940)   Temp   98 °F (36.7 °C) (12/27/17 0940)   Pulse   63 (12/27/17 0940)   Resp   16 (12/27/17 0940)     SpO2   95 % (12/27/17 0940)     Post Anesthesia Care and Evaluation    Patient location during evaluation: bedside  Patient participation: complete - patient participated  Level of consciousness: awake and alert  Pain score: 0  Pain management: adequate  Airway patency: patent  Anesthetic complications: No anesthetic complications  PONV Status: none  Cardiovascular status: acceptable  Respiratory status: acceptable  Hydration status: acceptable

## 2018-01-01 LAB
LAB AP CASE REPORT: NORMAL
Lab: NORMAL
PATH REPORT.FINAL DX SPEC: NORMAL

## 2018-01-05 PROBLEM — D12.4 ADENOMATOUS POLYP OF DESCENDING COLON: Status: ACTIVE | Noted: 2018-01-05

## 2018-01-25 ENCOUNTER — OFFICE VISIT (OUTPATIENT)
Dept: GASTROENTEROLOGY | Facility: CLINIC | Age: 58
End: 2018-01-25

## 2018-01-25 VITALS
WEIGHT: 143.4 LBS | SYSTOLIC BLOOD PRESSURE: 122 MMHG | DIASTOLIC BLOOD PRESSURE: 76 MMHG | BODY MASS INDEX: 23.89 KG/M2 | HEIGHT: 65 IN

## 2018-01-25 DIAGNOSIS — K21.9 GASTROESOPHAGEAL REFLUX DISEASE, ESOPHAGITIS PRESENCE NOT SPECIFIED: ICD-10-CM

## 2018-01-25 DIAGNOSIS — K63.5 POLYP OF COLON, UNSPECIFIED PART OF COLON, UNSPECIFIED TYPE: ICD-10-CM

## 2018-01-25 DIAGNOSIS — R19.7 DIARRHEA, UNSPECIFIED TYPE: Primary | ICD-10-CM

## 2018-01-25 PROCEDURE — 99213 OFFICE O/P EST LOW 20 MIN: CPT | Performed by: INTERNAL MEDICINE

## 2018-01-25 NOTE — PROGRESS NOTES
PATIENT INFORMATION  Chiquita Hebert       - 1960    CHIEF COMPLAINT  Chief Complaint   Patient presents with   • Diarrhea     FOLLOW UP ON COLONOSCOPY   • Nausea   • Heartburn       HISTORY OF PRESENT ILLNESS  Diarrhea    Associated symptoms include coughing and headaches.   Nausea   Associated symptoms include coughing, fatigue, headaches, nausea and numbness.   Heartburn   She complains of coughing, nausea and wheezing. Associated symptoms include fatigue.     She is here in follow up from her cls and is doing about the same. Pathology is reviewed with her. No microscopic colitis. She on bentyl bid and does not think this has helped. No blood in stool. Weight has been stable. No nausea or vomiting.      REVIEW OF SYSTEMS  Review of Systems   Constitutional: Positive for fatigue.   HENT: Positive for rhinorrhea and sneezing.    Respiratory: Positive for cough, wheezing and stridor.    Gastrointestinal: Positive for diarrhea and nausea.        REFLUX   Endocrine: Positive for polydipsia and polyuria.   Genitourinary: Positive for frequency and urgency.   Neurological: Positive for dizziness, light-headedness, numbness and headaches.   Hematological: Bruises/bleeds easily.   Psychiatric/Behavioral:        DEPRESSION   All other systems reviewed and are negative.        ACTIVE PROBLEMS  Patient Active Problem List    Diagnosis   • Adenomatous polyp of descending colon [D12.4]   • Abdominal pain [R10.9]     Overview Note:     Added automatically from request for surgery [id number]     • Diarrhea [R19.7]     Overview Note:     Added automatically from request for surgery [id number]     • History of coronary artery stent placement [Z95.5]   • Coronary artery disease involving native coronary artery of native heart without angina pectoris [I25.10]   • Hyperlipidemia [E78.5]   • Tobacco abuse [Z72.0]   • Heart murmur [R01.1]   • Rectal pain [K62.89]   • Trigger middle finger of right hand [M65.331]         PAST MEDICAL  HISTORY  Past Medical History:   Diagnosis Date   • Anxiety    • Chest pain    • Colon polyp    • Constipation    • Fatigue    • Gastritis    • GERD (gastroesophageal reflux disease)    • H/O esophagogastroduodenoscopy 01/30/2010    Diagnostic    • Heart disease    • Heart murmur    • Heart palpitations    • Hyperlipidemia    • Hypertension          SURGICAL HISTORY  Past Surgical History:   Procedure Laterality Date   • ANGIOPLASTY      Cath Stent Placement.  Stenting of the LAD   • CARDIAC CATHETERIZATION     • COLONOSCOPY N/A 12/27/2017    Procedure: COLONOSCOPY WITH BIOPSIES; POLYPECTOMY;  Surgeon: Joceline Singh MD;  Location: [facility];  Service:    • CORONARY ANGIOPLASTY     • CORONARY STENT PLACEMENT     • HYSTERECTOMY     • UPPER GASTROINTESTINAL ENDOSCOPY           FAMILY HISTORY  Family History   Problem Relation Age of Onset   • Hypertension Mother    • Stroke Mother    • Thyroid disease Mother    • Heart disease Father    • Hypertension Father    • Cancer Sister    • Diabetes Sister    • No Known Problems Maternal Grandmother    • No Known Problems Maternal Grandfather    • No Known Problems Paternal Grandmother    • No Known Problems Paternal Grandfather    • Diabetes Sister    • No Known Problems Sister    • No Known Problems Sister          SOCIAL HISTORY  Social History     Occupational History   • Not on file.     Social History Main Topics   • Smoking status: Current Every Day Smoker     Packs/day: 1.00     Years: 25.00     Types: Cigarettes   • Smokeless tobacco: Never Used   • Alcohol use Yes      Comment: OCCASIONAL   • Drug use: No   • Sexual activity: Defer         CURRENT MEDICATIONS    Current Outpatient Prescriptions:   •  aspirin 81 MG tablet, Take 1 tablet by mouth daily., Disp: , Rfl:   •  clopidogrel (PLAVIX) 75 MG tablet, TAKE ONE TABLET BY MOUTH ONCE DAILY, Disp: 90 tablet, Rfl: 3  •  dicyclomine (BENTYL) 20 MG tablet, Take 1 tablet by mouth Every 6 (Six) Hours., Disp: 60 tablet,  "Rfl: 4  •  Eluxadoline 100 MG tablet, Take 1 tablet by mouth 2 (Two) Times a Day., Disp: 60 tablet, Rfl: 5  •  escitalopram (LEXAPRO) 20 MG tablet, Take 20 mg by mouth Daily., Disp: , Rfl:   •  gabapentin (NEURONTIN) 400 MG capsule, Take 1 capsule by mouth daily., Disp: , Rfl:   •  lisinopril (PRINIVIL,ZESTRIL) 5 MG tablet, Take 1 tablet by mouth daily., Disp: , Rfl:   •  magnesium oxide (MAG-OX) 400 MG tablet, Take 400 mg by mouth daily., Disp: , Rfl:   •  omeprazole (PriLOSEC) 20 MG capsule, Take 1 capsule by mouth daily., Disp: , Rfl:   •  ondansetron (ZOFRAN) 4 MG tablet, , Disp: , Rfl:   •  raNITIdine (ZANTAC) 150 MG tablet, , Disp: , Rfl:   •  simvastatin (ZOCOR) 40 MG tablet, Take 1 tablet by mouth daily., Disp: , Rfl:   •  vitamin D (ERGOCALCIFEROL) 72852 UNITS capsule capsule, 50,000 Units Every 7 (Seven) Days. EVERY Monday AND FRIDAY, Disp: , Rfl:     ALLERGIES  Penicillins    VITALS  Vitals:    01/25/18 0912   BP: 122/76   Weight: 65 kg (143 lb 6.4 oz)   Height: 165.1 cm (65\")       LAST RESULTS   Admission on 12/27/2017, Discharged on 12/27/2017   Component Date Value Ref Range Status   • Case Report 12/27/2017    Final                    Value:Surgical Pathology Report                         Case: [id number]                                  Authorizing Provider:  Joceline Singh MD          Collected:           12/27/2017 11:41 AM          Ordering Location:     [facility]   Received:            12/27/2017 03:48 PM                                 OR                                                                           Pathologist:           Jerome Jackson MD                                                           Specimens:   1) - Large Intestine, random bx                                                                     2) - Large Intestine, Left / Descending Colon, X2                                                   3) - Large Intestine, Sigmoid Colon, polyp x1                 "                                       4) - Large Intestine, Rectum, rectal polyp                                                • Final Diagnosis 12/27/2017    Final                    Value:This result contains rich text formatting which cannot be displayed here.     No results found.    PHYSICAL EXAM  Physical Exam   Constitutional: She is oriented to person, place, and time. She appears well-developed and well-nourished. No distress.   HENT:   Head: Normocephalic and atraumatic.   Mouth/Throat: Oropharynx is clear and moist.   Eyes: EOM are normal. Pupils are equal, round, and reactive to light.   Neck: Normal range of motion. No tracheal deviation present.   Cardiovascular: Normal rate, regular rhythm, normal heart sounds and intact distal pulses.  Exam reveals no gallop and no friction rub.    No murmur heard.  Pulmonary/Chest: Effort normal and breath sounds normal. No stridor. No respiratory distress. She has no wheezes. She has no rales. She exhibits no tenderness.   Abdominal: Soft. Bowel sounds are normal. She exhibits no distension. There is no tenderness. There is no rebound and no guarding.   Musculoskeletal: She exhibits no edema.   Lymphadenopathy:     She has no cervical adenopathy.   Neurological: She is alert and oriented to person, place, and time.   Skin: Skin is warm. She is not diaphoretic.   Psychiatric: She has a normal mood and affect. Her behavior is normal. Judgment and thought content normal.   Nursing note and vitals reviewed.      ASSESSMENT  Diagnoses and all orders for this visit:    Diarrhea, unspecified type    Gastroesophageal reflux disease, esophagitis presence not specified    Other orders  -     Eluxadoline 100 MG tablet; Take 1 tablet by mouth 2 (Two) Times a Day.          PLAN  No Follow-up on file.    Suspect ibs with diarrhea.  Plan viberzi if not covered well can try lotronex. Will give reading material on lotrenex.  If not covered either, can try lomotil  bid.

## 2018-02-09 ENCOUNTER — TELEPHONE (OUTPATIENT)
Dept: SURGERY | Facility: CLINIC | Age: 58
End: 2018-02-09

## 2018-02-09 ENCOUNTER — TELEPHONE (OUTPATIENT)
Dept: GASTROENTEROLOGY | Facility: CLINIC | Age: 58
End: 2018-02-09

## 2018-02-12 RX ORDER — ALOSETRON HYDROCHLORIDE 0.5 MG/1
0.5 TABLET, FILM COATED ORAL 2 TIMES DAILY
Qty: 60 TABLET | Refills: 3 | Status: SHIPPED | OUTPATIENT
Start: 2018-02-12 | End: 2018-04-23

## 2018-02-21 ENCOUNTER — TRANSCRIBE ORDERS (OUTPATIENT)
Dept: ADMINISTRATIVE | Facility: HOSPITAL | Age: 58
End: 2018-02-21

## 2018-02-21 DIAGNOSIS — R10.83 ABDOMINAL COLIC: Primary | ICD-10-CM

## 2018-03-05 ENCOUNTER — APPOINTMENT (OUTPATIENT)
Dept: NUCLEAR MEDICINE | Facility: HOSPITAL | Age: 58
End: 2018-03-05
Attending: NURSE PRACTITIONER

## 2018-03-19 ENCOUNTER — OFFICE (OUTPATIENT)
Dept: URBAN - METROPOLITAN AREA CLINIC 66 | Facility: CLINIC | Age: 58
End: 2018-03-19
Payer: COMMERCIAL

## 2018-03-19 VITALS
HEART RATE: 90 BPM | DIASTOLIC BLOOD PRESSURE: 70 MMHG | HEIGHT: 65 IN | TEMPERATURE: 98.6 F | SYSTOLIC BLOOD PRESSURE: 125 MMHG | WEIGHT: 142 LBS

## 2018-03-19 DIAGNOSIS — K52.9 NONINFECTIVE GASTROENTERITIS AND COLITIS, UNSPECIFIED: ICD-10-CM

## 2018-03-19 DIAGNOSIS — K21.9 GASTRO-ESOPHAGEAL REFLUX DISEASE WITHOUT ESOPHAGITIS: ICD-10-CM

## 2018-03-19 DIAGNOSIS — R14.0 ABDOMINAL DISTENSION (GASEOUS): ICD-10-CM

## 2018-03-19 PROCEDURE — 99243 OFF/OP CNSLTJ NEW/EST LOW 30: CPT

## 2018-03-19 RX ORDER — METRONIDAZOLE 250 MG/1
TABLET, FILM COATED ORAL
Qty: 120 | Refills: 0 | Status: COMPLETED
Start: 2018-03-19 | End: 2018-07-11

## 2018-03-29 ENCOUNTER — AMBULATORY SURGICAL CENTER (OUTPATIENT)
Dept: URBAN - METROPOLITAN AREA SURGERY 20 | Facility: SURGERY | Age: 58
End: 2018-03-29

## 2018-03-29 ENCOUNTER — OFFICE (OUTPATIENT)
Dept: URBAN - METROPOLITAN AREA PATHOLOGY 4 | Facility: PATHOLOGY | Age: 58
End: 2018-03-29

## 2018-03-29 VITALS — HEIGHT: 65 IN

## 2018-03-29 DIAGNOSIS — K20.8 OTHER ESOPHAGITIS: ICD-10-CM

## 2018-03-29 DIAGNOSIS — K52.9 NONINFECTIVE GASTROENTERITIS AND COLITIS, UNSPECIFIED: ICD-10-CM

## 2018-03-29 DIAGNOSIS — R14.0 ABDOMINAL DISTENSION (GASEOUS): ICD-10-CM

## 2018-03-29 DIAGNOSIS — K21.0 GASTRO-ESOPHAGEAL REFLUX DISEASE WITH ESOPHAGITIS: ICD-10-CM

## 2018-03-29 DIAGNOSIS — K21.9 GASTRO-ESOPHAGEAL REFLUX DISEASE WITHOUT ESOPHAGITIS: ICD-10-CM

## 2018-03-29 DIAGNOSIS — A04.9 BACTERIAL INTESTINAL INFECTION, UNSPECIFIED: ICD-10-CM

## 2018-03-29 LAB
GI HISTOLOGY: A. SELECT: (no result)
GI HISTOLOGY: B. SELECT: (no result)
GI HISTOLOGY: PDF REPORT: (no result)

## 2018-03-29 PROCEDURE — 43239 EGD BIOPSY SINGLE/MULTIPLE: CPT

## 2018-03-29 PROCEDURE — 88305 TISSUE EXAM BY PATHOLOGIST: CPT

## 2018-03-29 RX ADMIN — PROPOFOL: 10 INJECTION, EMULSION INTRAVENOUS at 12:12

## 2018-03-29 NOTE — SERVICEHPINOTES
ARIEL SEYMOUR  is a  57  female   who presents today for a  EGD   for   the indications listed below. The updated Patient Profile was reviewed prior to the procedure, in conjunction with the Physical Exam, including medical conditions, surgical procedures, medications, allergies, family history and social history. See Physical Exam time stamp below for date and time of HPI completion.Pre-operatively, I reviewed the indication(s) for the procedure, the risks of the procedure [including but not limited to: unexpected bleeding possibly requiring hospitalization and/or unplanned repeat procedures, perforation possibly requiring surgical treatment, missed lesions and complications of sedation/MAC (also explained by anesthesia staff)]. I have evaluated the patient for risks associated with the planned anesthesia and the procedure to be performed and find the patient an acceptable candidate for IV sedation.Multiple opportunities were provided for any questions or concerns, and all questions were answered satisfactorily before any anesthesia was administered. We will proceed with the planned procedure.BR

## 2018-04-20 ENCOUNTER — OFFICE (OUTPATIENT)
Dept: URBAN - METROPOLITAN AREA CLINIC 66 | Facility: CLINIC | Age: 58
End: 2018-04-20

## 2018-04-20 VITALS
SYSTOLIC BLOOD PRESSURE: 122 MMHG | WEIGHT: 139 LBS | HEART RATE: 73 BPM | TEMPERATURE: 98.5 F | DIASTOLIC BLOOD PRESSURE: 69 MMHG | HEIGHT: 65 IN

## 2018-04-20 DIAGNOSIS — R11.0 NAUSEA: ICD-10-CM

## 2018-04-20 DIAGNOSIS — K21.9 GASTRO-ESOPHAGEAL REFLUX DISEASE WITHOUT ESOPHAGITIS: ICD-10-CM

## 2018-04-20 DIAGNOSIS — K58.0 IRRITABLE BOWEL SYNDROME WITH DIARRHEA: ICD-10-CM

## 2018-04-20 PROCEDURE — 99213 OFFICE O/P EST LOW 20 MIN: CPT

## 2018-04-20 RX ORDER — RIFAXIMIN 550 MG/1
TABLET ORAL
Qty: 42 | Refills: 2 | Status: COMPLETED
Start: 2018-04-20 | End: 2018-06-20

## 2018-04-23 ENCOUNTER — OFFICE VISIT (OUTPATIENT)
Dept: CARDIOLOGY | Facility: CLINIC | Age: 58
End: 2018-04-23

## 2018-04-23 VITALS
DIASTOLIC BLOOD PRESSURE: 80 MMHG | HEART RATE: 65 BPM | WEIGHT: 141 LBS | SYSTOLIC BLOOD PRESSURE: 136 MMHG | HEIGHT: 65 IN | BODY MASS INDEX: 23.49 KG/M2

## 2018-04-23 DIAGNOSIS — I25.118 CORONARY ARTERY DISEASE OF NATIVE ARTERY OF NATIVE HEART WITH STABLE ANGINA PECTORIS (HCC): Primary | ICD-10-CM

## 2018-04-23 DIAGNOSIS — Z72.0 TOBACCO ABUSE: ICD-10-CM

## 2018-04-23 PROCEDURE — 93000 ELECTROCARDIOGRAM COMPLETE: CPT | Performed by: INTERNAL MEDICINE

## 2018-04-23 PROCEDURE — 99213 OFFICE O/P EST LOW 20 MIN: CPT | Performed by: INTERNAL MEDICINE

## 2018-04-23 RX ORDER — ALENDRONATE SODIUM 70 MG/1
70 TABLET ORAL
Status: ON HOLD | COMMUNITY
End: 2022-03-24

## 2018-04-23 RX ORDER — RIFAXIMIN 550 MG/1
1 TABLET ORAL 3 TIMES DAILY
COMMUNITY
Start: 2018-04-20 | End: 2019-04-29

## 2018-04-23 NOTE — PROGRESS NOTES
Subjective:     Encounter Date:04/23/2018      Patient ID: Chiquita Hebert is a 57 y.o. female.    Chief Complaint:  History of Present Illness    {Common H&P Review Areas:17925}    ROS      ECG 12 Lead  Date/Time: 4/23/2018 12:04 PM  Performed by: CITLALI DE LA O  Authorized by: CITLALI DE LA O                  Objective:     Physical Exam    Lab Review:       Assessment:         No diagnosis found.       Plan:

## 2018-04-23 NOTE — PROGRESS NOTES
Subjective:     Encounter Date:04/23/2018      Patient ID: Chiquita Hebert is a 57 y.o. female.    Chief Complaint: CAD, smoking    History of Present Illness    Dear Analy Tyler:    I had the pleasure of seeing the patient in cardiac follow-up today.  As you well know, she is a navarro, 57-year-old woman with history of ongoing smoking and prior coronary artery disease.  She has had her proximal left anterior descending stented previously.    She comes in for her one-year follow-up.  Since I have last seen her, she continues to work third shift.  She says that she is tired all of the time because of this.  She continues to smoke.  She has class I angina.  She gets out of breath with exertion.    She has been diagnosed with irritable bowel syndrome.          Review of Systems   All other systems reviewed and are negative.        ECG 12 Lead  Date/Time: 4/23/2018 12:13 PM  Performed by: CITLALI DE LA O  Authorized by: CITLALI DE LA O   Comparison: compared with previous ECG   Similar to previous ECG  Rhythm: sinus rhythm  BPM: 65  Clinical impression: normal ECG               Objective:     Physical Exam   Constitutional: She is oriented to person, place, and time. She appears well-developed and well-nourished.   HENT:   Head: Normocephalic and atraumatic.   Neck: Normal range of motion. Neck supple.   Cardiovascular: Normal rate, regular rhythm and normal heart sounds.    Pulmonary/Chest: Effort normal and breath sounds normal.   Abdominal: Soft. Bowel sounds are normal.   Musculoskeletal: Normal range of motion.   Neurological: She is alert and oriented to person, place, and time.   Skin: Skin is warm and dry.   Psychiatric: She has a normal mood and affect. Her behavior is normal. Thought content normal.   Vitals reviewed.      Lab Review:       Assessment:          Diagnosis Plan   1. Coronary artery disease of native artery of native heart with stable angina pectoris     2. Tobacco abuse            Plan:       It was a  pleasure to see your patient in cardiac follow-up today.  She is doing well from the cardiac standpoint with class I stable angina.  She continues to smoke and I have urged her to consider quitting.  She is on a good preventative regimen for coronary disease.  I have also asked her to consider changing from third shift to see if she can improve her schedule.  She will see me again in one year or sooner if symptoms warrant.    Coronary Artery Disease  Assessment  • The patient has CCS class I - angina only during strenuous or prolonged physical activity    Plan  • Lifestyle modifications discussed include adhering to a heart healthy diet, avoidance of tobacco products, maintenance of a healthy weight, medication compliance, regular exercise and regular monitoring of cholesterol and blood pressure    Subjective - Objective  • There has been a previous stent procedure using VILLA  • Current antiplatelet therapy includes aspirin 81 mg and clopidogrel 75 mg

## 2018-04-30 ENCOUNTER — APPOINTMENT (OUTPATIENT)
Dept: WOMENS IMAGING | Facility: HOSPITAL | Age: 58
End: 2018-04-30

## 2018-04-30 PROCEDURE — 77067 SCR MAMMO BI INCL CAD: CPT | Performed by: RADIOLOGY

## 2018-05-09 ENCOUNTER — OFFICE (OUTPATIENT)
Dept: URBAN - METROPOLITAN AREA CLINIC 66 | Facility: CLINIC | Age: 58
End: 2018-05-09

## 2018-05-09 VITALS
SYSTOLIC BLOOD PRESSURE: 148 MMHG | WEIGHT: 141 LBS | HEART RATE: 59 BPM | DIASTOLIC BLOOD PRESSURE: 67 MMHG | HEIGHT: 65 IN

## 2018-05-09 DIAGNOSIS — R11.0 NAUSEA: ICD-10-CM

## 2018-05-09 DIAGNOSIS — K58.0 IRRITABLE BOWEL SYNDROME WITH DIARRHEA: ICD-10-CM

## 2018-05-09 DIAGNOSIS — R14.0 ABDOMINAL DISTENSION (GASEOUS): ICD-10-CM

## 2018-05-09 PROBLEM — K20.9 ESOPHAGITIS, UNSPECIFIED: Status: ACTIVE | Noted: 2018-03-29

## 2018-05-09 PROCEDURE — 99213 OFFICE O/P EST LOW 20 MIN: CPT

## 2018-06-20 ENCOUNTER — OFFICE (OUTPATIENT)
Dept: URBAN - METROPOLITAN AREA CLINIC 66 | Facility: CLINIC | Age: 58
End: 2018-06-20

## 2018-06-20 VITALS
HEIGHT: 65 IN | DIASTOLIC BLOOD PRESSURE: 83 MMHG | WEIGHT: 140 LBS | SYSTOLIC BLOOD PRESSURE: 127 MMHG | HEART RATE: 71 BPM

## 2018-06-20 DIAGNOSIS — R14.0 ABDOMINAL DISTENSION (GASEOUS): ICD-10-CM

## 2018-06-20 DIAGNOSIS — K21.9 GASTRO-ESOPHAGEAL REFLUX DISEASE WITHOUT ESOPHAGITIS: ICD-10-CM

## 2018-06-20 DIAGNOSIS — R10.30 LOWER ABDOMINAL PAIN, UNSPECIFIED: ICD-10-CM

## 2018-06-20 DIAGNOSIS — R11.0 NAUSEA: ICD-10-CM

## 2018-06-20 DIAGNOSIS — K52.9 NONINFECTIVE GASTROENTERITIS AND COLITIS, UNSPECIFIED: ICD-10-CM

## 2018-06-20 PROCEDURE — 99214 OFFICE O/P EST MOD 30 MIN: CPT

## 2018-07-11 ENCOUNTER — OFFICE (OUTPATIENT)
Dept: URBAN - METROPOLITAN AREA CLINIC 66 | Facility: CLINIC | Age: 58
End: 2018-07-11

## 2018-07-11 VITALS
HEIGHT: 65 IN | DIASTOLIC BLOOD PRESSURE: 58 MMHG | SYSTOLIC BLOOD PRESSURE: 120 MMHG | HEART RATE: 72 BPM | WEIGHT: 142 LBS

## 2018-07-11 DIAGNOSIS — K59.4 ANAL SPASM: ICD-10-CM

## 2018-07-11 DIAGNOSIS — K86.89 OTHER SPECIFIED DISEASES OF PANCREAS: ICD-10-CM

## 2018-07-11 DIAGNOSIS — R63.4 ABNORMAL WEIGHT LOSS: ICD-10-CM

## 2018-07-11 PROCEDURE — 99213 OFFICE O/P EST LOW 20 MIN: CPT

## 2018-07-11 RX ORDER — PANCRELIPASE 40000; 136000; 218000 [USP'U]/1; [USP'U]/1; [USP'U]/1
CAPSULE, DELAYED RELEASE ORAL
Qty: 210 | Refills: 5 | Status: ACTIVE
Start: 2018-07-11

## 2018-07-11 RX ORDER — DICYCLOMINE HYDROCHLORIDE 10 MG/1
40 CAPSULE ORAL
Qty: 60 | Refills: 11 | Status: ACTIVE
Start: 2018-07-11

## 2018-08-03 ENCOUNTER — TRANSCRIBE ORDERS (OUTPATIENT)
Dept: ADMINISTRATIVE | Facility: HOSPITAL | Age: 58
End: 2018-08-03

## 2018-08-03 ENCOUNTER — HOSPITAL ENCOUNTER (OUTPATIENT)
Dept: CT IMAGING | Facility: HOSPITAL | Age: 58
Discharge: HOME | End: 2018-08-03
Attending: NURSE PRACTITIONER | Admitting: NURSE PRACTITIONER

## 2018-08-03 DIAGNOSIS — R10.31 ABDOMINAL PAIN, RIGHT LOWER QUADRANT: ICD-10-CM

## 2018-08-03 DIAGNOSIS — R10.31 ABDOMINAL PAIN, RIGHT LOWER QUADRANT: Primary | ICD-10-CM

## 2018-08-03 PROCEDURE — 0 IOPAMIDOL PER 1 ML: Performed by: NURSE PRACTITIONER

## 2018-08-03 PROCEDURE — 74177 CT ABD & PELVIS W/CONTRAST: CPT

## 2018-08-03 RX ADMIN — IOPAMIDOL 100 ML: 755 INJECTION, SOLUTION INTRAVENOUS at 15:17

## 2018-11-20 RX ORDER — CLOPIDOGREL BISULFATE 75 MG/1
TABLET ORAL
Qty: 90 TABLET | Refills: 3 | Status: SHIPPED | OUTPATIENT
Start: 2018-11-20 | End: 2019-11-27 | Stop reason: SDUPTHER

## 2019-04-29 ENCOUNTER — OFFICE VISIT (OUTPATIENT)
Dept: CARDIOLOGY | Facility: CLINIC | Age: 59
End: 2019-04-29

## 2019-04-29 VITALS
BODY MASS INDEX: 23.99 KG/M2 | SYSTOLIC BLOOD PRESSURE: 128 MMHG | HEIGHT: 65 IN | DIASTOLIC BLOOD PRESSURE: 88 MMHG | WEIGHT: 144 LBS | OXYGEN SATURATION: 97 % | HEART RATE: 74 BPM

## 2019-04-29 DIAGNOSIS — I25.10 CORONARY ARTERY DISEASE INVOLVING NATIVE CORONARY ARTERY OF NATIVE HEART WITHOUT ANGINA PECTORIS: Primary | ICD-10-CM

## 2019-04-29 PROBLEM — G56.03 CARPAL TUNNEL SYNDROME ON BOTH SIDES: Status: ACTIVE | Noted: 2019-04-29

## 2019-04-29 PROBLEM — F32.A DEPRESSION: Status: ACTIVE | Noted: 2019-04-29

## 2019-04-29 PROBLEM — I10 HIGH BLOOD PRESSURE: Status: ACTIVE | Noted: 2019-04-29

## 2019-04-29 PROBLEM — F41.9 ANXIETY: Status: ACTIVE | Noted: 2019-04-29

## 2019-04-29 PROCEDURE — 93000 ELECTROCARDIOGRAM COMPLETE: CPT | Performed by: PHYSICIAN ASSISTANT

## 2019-04-29 PROCEDURE — 99213 OFFICE O/P EST LOW 20 MIN: CPT | Performed by: PHYSICIAN ASSISTANT

## 2019-04-29 NOTE — PROGRESS NOTES
Date of Office Visit: 2019  Encounter Provider: BUCKY Golden  Place of Service: [facility]  Patient Name: Chiquita Hebert  :1960    Chief Complaint   Patient presents with   • Coronary Artery Disease     1 year follow up   :     HPI: Chiquita Hebert is a 58 y.o. female who presents today for follow-up.  Old records have been obtained and reviewed by me.  She is a patient of Dr. Patel with a past cardiac history significant for coronary artery disease.  She has had a prior stenting of her proximal LAD.  She has continued to smoke.  She was last in our office to see Dr. Dodd on 2018.  At that visit she had complaints of chronic fatigue secondary to her job working the third shift.  She was still smoking.  She had class I angina and was out of breath with exertion.  She had also been diagnosed with irritable bowel syndrome.  No changes were made to her medical regimen, and she is here today for yearly visit.   Over the past year she has been about the same.  She still smokes a pack a day.  She still works third shift.  She states that she had her carotid arteries checked by Dr. Salter, according to the patient she had 70% stenosis on the right and 60s percent on the left.  Her diet is actually pretty poor.  She has some palpitations and chronic shortness of breath on exertion that is unchanged.  She also complains of feeling a little lightheaded at night when she sleeping.  She denies any chest pain, worsening shortness of breath, edema, dizziness, or syncope.      Past Medical History:   Diagnosis Date   • Anxiety    • Chest pain    • Colon polyp    • Constipation    • Fatigue    • Gastritis    • GERD (gastroesophageal reflux disease)    • H/O esophagogastroduodenoscopy 2010    Diagnostic    • Heart disease    • Heart murmur    • Heart palpitations    • Hyperlipidemia    • Hypertension        Past Surgical History:   Procedure Laterality Date   •  ANGIOPLASTY      Cath Stent Placement.  Stenting of the LAD   • CARDIAC CATHETERIZATION     • COLONOSCOPY N/A 12/27/2017    Procedure: COLONOSCOPY WITH BIOPSIES; POLYPECTOMY;  Surgeon: Joceline Singh MD;  Location: [facility];  Service:    • CORONARY ANGIOPLASTY     • CORONARY STENT PLACEMENT     • HYSTERECTOMY     • UPPER GASTROINTESTINAL ENDOSCOPY         Social History     Socioeconomic History   • Marital status: Single     Spouse name: Not on file   • Number of children: Not on file   • Years of education: Not on file   • Highest education level: Not on file   Tobacco Use   • Smoking status: Current Every Day Smoker     Packs/day: 1.00     Years: 25.00     Pack years: 25.00     Types: Cigarettes   • Smokeless tobacco: Never Used   Substance and Sexual Activity   • Alcohol use: Yes     Alcohol/week: 0.6 oz     Types: 1 Shots of liquor per week     Comment: OCCASIONAL   • Drug use: No   • Sexual activity: Defer       Family History   Problem Relation Age of Onset   • Hypertension Mother    • Stroke Mother    • Thyroid disease Mother    • Heart disease Father    • Hypertension Father    • Cancer Sister    • Diabetes Sister    • No Known Problems Maternal Grandmother    • No Known Problems Maternal Grandfather    • No Known Problems Paternal Grandmother    • No Known Problems Paternal Grandfather    • Diabetes Sister    • No Known Problems Sister    • No Known Problems Sister        Review of Systems   Constitution: Positive for malaise/fatigue. Negative for chills and fever.   Cardiovascular: Positive for dyspnea on exertion and palpitations. Negative for chest pain, leg swelling, near-syncope, orthopnea, paroxysmal nocturnal dyspnea and syncope.   Respiratory: Negative for cough and shortness of breath.    Musculoskeletal: Negative for joint pain, joint swelling and myalgias.   Gastrointestinal: Negative for abdominal pain, diarrhea, melena, nausea and vomiting.   Genitourinary: Negative for frequency and  "hematuria.   Neurological: Negative for light-headedness, numbness, paresthesias and seizures.   Allergic/Immunologic: Negative.    All other systems reviewed and are negative.      Allergies   Allergen Reactions   • Penicillins          Current Outpatient Medications:   •  alendronate (FOSAMAX) 70 MG tablet, Take 70 mg by mouth Every 7 (Seven) Days., Disp: , Rfl:   •  aspirin 81 MG tablet, Take 1 tablet by mouth daily., Disp: , Rfl:   •  clopidogrel (PLAVIX) 75 MG tablet, TAKE ONE TABLET BY MOUTH ONCE DAILY, Disp: 90 tablet, Rfl: 3  •  escitalopram (LEXAPRO) 20 MG tablet, Take 20 mg by mouth Daily., Disp: , Rfl:   •  gabapentin (NEURONTIN) 400 MG capsule, Take 2 capsules by mouth Daily., Disp: , Rfl:   •  lisinopril (PRINIVIL,ZESTRIL) 5 MG tablet, Take 1 tablet by mouth daily., Disp: , Rfl:   •  raNITIdine (ZANTAC) 150 MG tablet, Take 300 mg by mouth 2 (Two) Times a Day., Disp: , Rfl:   •  simvastatin (ZOCOR) 40 MG tablet, Take 1 tablet by mouth daily., Disp: , Rfl:   •  vitamin D (ERGOCALCIFEROL) 77656 UNITS capsule capsule, 50,000 Units. EVERY Monday AND FRIDAY, Disp: , Rfl:       Objective:     Vitals:    04/29/19 1303 04/29/19 1309   BP: 122/82 128/88   BP Location: Right arm Left arm   Pulse: 74    SpO2: 97%    Weight: 65.3 kg (144 lb)    Height: 165.1 cm (65\")      Body mass index is 23.96 kg/m².    PHYSICAL EXAM:    Physical Exam   Constitutional: She is oriented to person, place, and time. She appears well-developed and well-nourished. No distress.   HENT:   Head: Normocephalic and atraumatic.   Eyes: Pupils are equal, round, and reactive to light.   Neck: No JVD present. No thyromegaly present.   Cardiovascular: Normal rate, regular rhythm, normal heart sounds and intact distal pulses.   No murmur heard.  Pulmonary/Chest: Effort normal and breath sounds normal. No respiratory distress.   Abdominal: Soft. Bowel sounds are normal. She exhibits no distension. There is no splenomegaly or hepatomegaly. There is " no tenderness.   Musculoskeletal: Normal range of motion. She exhibits no edema.   Neurological: She is alert and oriented to person, place, and time.   Skin: Skin is warm and dry. She is not diaphoretic. No erythema.   Psychiatric: She has a normal mood and affect. Her behavior is normal. Judgment normal.         ECG 12 Lead  Date/Time: 4/29/2019 1:31 PM  Performed by: Gavi Jhaveri PA  Authorized by: Gavi Jhaveri PA   Comparison: compared with previous ECG from 4/23/2018  Similar to previous ECG  Ectopy: atrial premature contractions  BPM: 68    Clinical impression: abnormal EKG  Comments: Indication: Coronary disease    Ectopic atrial rhythm.              Assessment:       Diagnosis Plan   1. Coronary artery disease involving native coronary artery of native heart without angina pectoris  ECG 12 Lead     Orders Placed This Encounter   Procedures   • ECG 12 Lead     This order was created via procedure documentation          Plan:       Overall she is stable however her risk factor modification is terrible.  She smokes, has a pretty poor diet, and is working third shift.  I think it is not a matter of if but more a matter of when she has another event.  We did discuss the necessity of her quitting smoking and trying to switch to a different shift so that she is not so tired all the time.  She is complaining of some palpitations, and her ECG today does show premature atrial contractions.  She also appears to be in an ectopic atrial rhythm.  I think that if she continues to have palpitations or they get worse I would recommend a monitor.  I am not going to make any changes, and she will follow-up with Dr. Moon in 1 year as a transfer of care from Dr. Dodd.    Coronary Artery Disease  Assessment  • The patient has no angina    Plan  • Lifestyle modifications discussed include adhering to a heart healthy diet and avoidance of tobacco products    Subjective - Objective  • There has been a previous stent  procedure using VILLA  • Current antiplatelet therapy includes aspirin 81 mg and clopidogrel 75 mg        As always, it has been a pleasure to participate in your patient's care.      Sincerely,         Gavi Jhaveri PA-C

## 2019-05-06 ENCOUNTER — APPOINTMENT (OUTPATIENT)
Dept: WOMENS IMAGING | Facility: HOSPITAL | Age: 59
End: 2019-05-06

## 2019-05-06 PROCEDURE — 77063 BREAST TOMOSYNTHESIS BI: CPT | Performed by: RADIOLOGY

## 2019-05-06 PROCEDURE — 77067 SCR MAMMO BI INCL CAD: CPT | Performed by: RADIOLOGY

## 2019-11-27 RX ORDER — CLOPIDOGREL BISULFATE 75 MG/1
TABLET ORAL
Qty: 90 TABLET | Refills: 3 | Status: SHIPPED | OUTPATIENT
Start: 2019-11-27 | End: 2020-11-18

## 2020-03-30 ENCOUNTER — TELEPHONE (OUTPATIENT)
Dept: CARDIOLOGY | Facility: CLINIC | Age: 60
End: 2020-03-30

## 2020-03-30 NOTE — TELEPHONE ENCOUNTER
Keesha,    She told me that these are new symptoms for her and not like when she had her previous MI.      We added her to your schedule for 1pm  Tomorrow (3/31/2020).  She says she will call vascular after she has spoken to you tomorrow.      Esha Hernandez RN  Triage [facility]

## 2020-03-30 NOTE — TELEPHONE ENCOUNTER
Esha- Did she happen to mention if this was a previous symptom she had prior to her  previous stent?  Also is the right arm numbness/tingling constant in the last week or is it intermittent and happening with bilateral arm heaviness?  Is she having any neck pain or other neurologic type symptoms?    She should have an earlier appointment than May (leave that with Dr. Moon for now) but she can be added onto mine or Dr. Moon's schedule this week for a telephone/video visit. If she is not agreeable and needs to come in then it will have to be discussed and added on to the office MDs schedule.     She also has documented history of moderate bilateral carotid artery disease so I recommend she also discuss her concerns with her vascular surgeon who I believe is Dr. Salter as reported in last office note from April 2019 as she will likely need repeat imaging.

## 2020-03-30 NOTE — TELEPHONE ENCOUNTER
"Keesha,    Ms. Hebert is a patient of Dr. Rodríguez.  She has an appt in May but, she has been having bilateral arm heaviness x 2 months \"every now and then\", with right arm and hand numbness and tingling for the last week.    She denies and chest pain, SOA, exertion does not cause it. She says she can feel it coming on and it is at rest and with exertion.    I reviewed her medications and she states she is taking her medications as ordered. She does not keep a BP and heart rate log.    She is wondering if she needs to be seen before May.  We discussed our office procedures during the CoVid 19 pandemic and she is amenable to having a telephone visit if that is preferred.    Esha Hernandez RN  Triage [facility] [place]    "

## 2020-03-31 ENCOUNTER — OFFICE VISIT (OUTPATIENT)
Dept: CARDIOLOGY | Facility: CLINIC | Age: 60
End: 2020-03-31

## 2020-03-31 ENCOUNTER — TELEPHONE (OUTPATIENT)
Dept: CARDIOLOGY | Facility: CLINIC | Age: 60
End: 2020-03-31

## 2020-03-31 VITALS — HEIGHT: 65 IN | WEIGHT: 145 LBS | BODY MASS INDEX: 24.16 KG/M2

## 2020-03-31 DIAGNOSIS — R29.898 ARM HEAVINESS: Primary | ICD-10-CM

## 2020-03-31 DIAGNOSIS — Z72.0 TOBACCO ABUSE: ICD-10-CM

## 2020-03-31 DIAGNOSIS — E78.5 HYPERLIPIDEMIA, UNSPECIFIED HYPERLIPIDEMIA TYPE: ICD-10-CM

## 2020-03-31 DIAGNOSIS — R20.2 NUMBNESS AND TINGLING OF RIGHT ARM: ICD-10-CM

## 2020-03-31 DIAGNOSIS — R20.0 NUMBNESS AND TINGLING OF RIGHT ARM: ICD-10-CM

## 2020-03-31 DIAGNOSIS — Z95.5 HISTORY OF CORONARY ARTERY STENT PLACEMENT: ICD-10-CM

## 2020-03-31 DIAGNOSIS — I10 HYPERTENSION, UNSPECIFIED TYPE: ICD-10-CM

## 2020-03-31 DIAGNOSIS — I25.10 CORONARY ARTERY DISEASE INVOLVING NATIVE CORONARY ARTERY OF NATIVE HEART WITHOUT ANGINA PECTORIS: ICD-10-CM

## 2020-03-31 PROCEDURE — 99443 PR PHYS/QHP TELEPHONE EVALUATION 21-30 MIN: CPT | Performed by: NURSE PRACTITIONER

## 2020-03-31 PROCEDURE — 99406 BEHAV CHNG SMOKING 3-10 MIN: CPT | Performed by: NURSE PRACTITIONER

## 2020-03-31 RX ORDER — ONDANSETRON HYDROCHLORIDE 8 MG/1
TABLET, FILM COATED ORAL
COMMUNITY
Start: 2020-01-22

## 2020-03-31 RX ORDER — MONTELUKAST SODIUM 10 MG/1
10 TABLET ORAL NIGHTLY
COMMUNITY
Start: 2020-01-23

## 2020-03-31 RX ORDER — DICYCLOMINE HYDROCHLORIDE 10 MG/1
10 CAPSULE ORAL
COMMUNITY
Start: 2019-05-07 | End: 2020-03-31

## 2020-03-31 RX ORDER — FAMOTIDINE 40 MG/1
40 TABLET, FILM COATED ORAL 2 TIMES DAILY PRN
COMMUNITY
Start: 2020-01-22 | End: 2020-03-31

## 2020-03-31 RX ORDER — RANITIDINE 300 MG/1
300 CAPSULE ORAL 2 TIMES DAILY
COMMUNITY
Start: 2020-02-04 | End: 2020-03-31

## 2020-03-31 RX ORDER — CYCLOBENZAPRINE HCL 10 MG
10 TABLET ORAL
COMMUNITY
Start: 2019-09-06 | End: 2020-03-31

## 2020-03-31 RX ORDER — FLUTICASONE PROPIONATE 50 MCG
SPRAY, SUSPENSION (ML) NASAL
COMMUNITY
Start: 2019-06-06 | End: 2020-03-31

## 2020-03-31 RX ORDER — CETIRIZINE HYDROCHLORIDE 10 MG/1
10 TABLET ORAL
COMMUNITY
Start: 2019-01-28

## 2020-03-31 RX ORDER — BUSPIRONE HYDROCHLORIDE 10 MG/1
10 TABLET ORAL 2 TIMES DAILY
COMMUNITY
Start: 2020-01-06

## 2020-03-31 RX ORDER — ALBUTEROL SULFATE 90 UG/1
1 AEROSOL, METERED RESPIRATORY (INHALATION)
COMMUNITY
Start: 2019-04-15 | End: 2020-03-31

## 2020-03-31 NOTE — TELEPHONE ENCOUNTER
Katerin--I had already sent a message to Zarina bahena because I was not sure who was working on records.  If you could please track down her old cardiac catheterization and any stress test and send them to me.  She was a Dr. Dodd patient so I anticipate she will have some records in the old system.  She cannot remember how long ago her stent and cardiac catheterization was but she knows it was least 5 years ago.  Thanks so much!

## 2020-03-31 NOTE — PROGRESS NOTES
Date of Office Visit: 2020  Encounter Provider: MARCELLE Soriano  Primary Cardiologist: Dr. Moon  Place of Service: [facility] CARDIOLOGY  Patient Name: Chiquita Hebert  :1960      Subjective:     Chief Complaint:  Urgent telephone visit, bilateral arm heaviness right arm numbness and tingling, hx of CAD and stenting.    History of Present Illness:  Chiquita Hebert is a pleasant 59 y.o. female who is new to me .  Outside records have been obtained and reviewed by me.     This is a former patient of Dr. Dodd she has a history of coronary artery disease and previous stenting to her proximal LAD (date unknown), carotid artery disease reportedly last known to have 70% stenosis on the right and 60% stenosis on the left followed by vascular Dr. Salter, anxiety, smoker, class I angina chronic fatigue, hyperlipidemia, hypertension, GERD/gastritis, irritable bowel syndrome.    2019 patient was last in the office to see BUCKY Teran.  She was essentially about the same.  Unfortunately she had a very poor diet was still smoking a pack a day and was working third shift and had class I angina with dyspnea on exertion and chronic fatigue.  She also reported palpitations.  She also reported feeling a little lightheaded at night when sleeping.  Gavi felt she was stable but her risk factor modification was stable.  She did not think it was a matter of if but more a matter of when she had another event.  Gavi felt that PACs/ectopic atrial rhythm or possibly the source of her palpitations but if they got worse she recommended to monitor.  Patient recommended to follow-up with Dr. Moon in 1 year to transfer care from Dr. Dodd.  No changes were made to her medical regimen at that time.    She is presenting today for earlier care. This patient has consented to a telehealth visit via telephone. I spent  40 minutes in total including but not limited to the 16 minutes in direct  "conversation with this patient. All vitals recorded within this visit are reported by the patient.  She wanted to get checked out because over the last few months she will have intermittent episodes severe arm pressure/weakness that goes to her back and causes cramping in her back.  It can occur a few times a week but does not always happen daily.  She reports it feels like it takes \"forever to go away\".  She reports her symptoms last about 5 minutes and it typically resolves on its own but she usually has to sit down.  It can happen when she is standing or sitting it does not necessarily get brought on by exertion.  She reports it \"paralyzes her.\"  Though she does not feel anxious she was wondering if this was possibly a manifestation of a panic attack.  She does not have radiation of the symptoms anywhere else and it is not associated with any paresthesias or numbness tingling or neck pain.  It is not associated with pain/pressure/tightness in throat, chest or jaws.  She does not have associated shortness of breath, palpitations, diaphoresis, nausea, dizziness or other neurologic symptoms.  Separately from this she has been having numbness and tingling in the right arm that comes and goes.  She has not noticed it is worse with different neck movements.  At times she can wake up with her arm asleep.  She denies any significant weakness in her arm when she wakes up.  She is right-handed.  She does take gabapentin for restless leg syndrome.  She also notices when she gets allergy shots in both arms it \"feels like they are hitting something in there and it hurts\".  Again no neurologic symptoms like headaches, visual changes, facial numbness or weakness, speech difficulties etc.  She does have an appointment with vascular surgery in May to follow-up on her carotid disease.  She does not do any formal exercising but walks all day long at work at [employer].  She does have some chronic dyspnea on exertion with walking up " stairs or hills that is not new or worsening.  She has occasional mild dizziness/lightheadedness with standing quickly but no falls, syncope or near syncope.  Unfortunately she continues to smoke a pack per day.  She does not have a way to check her blood pressure heart rate but at her PCPs office in January her vital signs were very well controlled.  Her January labs were stable.  She tells me that she is not sure how long ago her previous stent was but it least over 5 years ago.  At that time she was having horrible heartburn as her anginal symptom.  The symptoms she is experiencing now are not similar to what she experienced when she required her ordinary stenting in the past.              Past Medical History:   Diagnosis Date   • Anxiety    • Chest pain    • Colon polyp    • Constipation    • Fatigue    • Gastritis    • GERD (gastroesophageal reflux disease)    • H/O esophagogastroduodenoscopy 01/30/2010    Diagnostic    • Heart disease    • Heart murmur    • Heart palpitations    • Hyperlipidemia    • Hypertension      Past Surgical History:   Procedure Laterality Date   • ANGIOPLASTY      Cath Stent Placement.  Stenting of the LAD   • CARDIAC CATHETERIZATION     • COLONOSCOPY N/A 12/27/2017    Procedure: COLONOSCOPY WITH BIOPSIES; POLYPECTOMY;  Surgeon: Joceline Singh MD;  Location: [facility];  Service:    • CORONARY ANGIOPLASTY     • CORONARY STENT PLACEMENT     • HYSTERECTOMY     • UPPER GASTROINTESTINAL ENDOSCOPY       Outpatient Medications Prior to Visit   Medication Sig Dispense Refill   • alendronate (FOSAMAX) 70 MG tablet Take 70 mg by mouth Every 7 (Seven) Days.     • aspirin 81 MG tablet Take 1 tablet by mouth daily.     • busPIRone (BUSPAR) 10 MG tablet Take 10 mg by mouth 2 (Two) Times a Day.     • cetirizine (zyrTEC) 10 MG tablet Take 10 mg by mouth.     • clopidogrel (PLAVIX) 75 MG tablet TAKE 1 TABLET BY MOUTH ONCE DAILY 90 tablet 3   • Coenzyme Q10 10 MG capsule Take  by mouth.     •  escitalopram (LEXAPRO) 20 MG tablet Take 20 mg by mouth Daily.     • gabapentin (NEURONTIN) 400 MG capsule Take 2 capsules by mouth Daily.     • lisinopril (PRINIVIL,ZESTRIL) 5 MG tablet Take 1 tablet by mouth daily.     • montelukast (SINGULAIR) 10 MG tablet Take 10 mg by mouth Every Night.     • ondansetron (ZOFRAN) 8 MG tablet TAKE 1 TABLET BY MOUTH EVERY 8 HOURS AS NEEDED FOR NAUSEA FOR UP TO 7 DAYS     • raNITIdine (ZANTAC) 150 MG tablet Take 300 mg by mouth 2 (Two) Times a Day.     • simvastatin (ZOCOR) 40 MG tablet Take 1 tablet by mouth daily.     • vitamin D (ERGOCALCIFEROL) 89280 UNITS capsule capsule 50,000 Units. EVERY Monday AND FRIDAY     • albuterol sulfate HFA (Ventolin HFA) 108 (90 Base) MCG/ACT inhaler Inhale 1 puff.     • cyclobenzaprine (FLEXERIL) 10 MG tablet Take 10 mg by mouth.     • dicyclomine (BENTYL) 10 MG capsule Take 10 mg by mouth.     • famotidine (PEPCID) 40 MG tablet Take 40 mg by mouth 2 (Two) Times a Day As Needed.     • fluticasone (FLONASE) 50 MCG/ACT nasal spray USE 2 SPRAY(S) IN EACH NOSTRIL ONCE DAILY     • raNITIdine (ZANTAC) 300 MG capsule Take 300 mg by mouth 2 (Two) Times a Day.       No facility-administered medications prior to visit.        Allergies as of 03/31/2020 - Reviewed 03/31/2020   Allergen Reaction Noted   • Penicillins  02/04/2016     Social History     Socioeconomic History   • Marital status: Single     Spouse name: Not on file   • Number of children: Not on file   • Years of education: Not on file   • Highest education level: Not on file   Tobacco Use   • Smoking status: Current Every Day Smoker     Packs/day: 1.00     Years: 25.00     Pack years: 25.00     Types: Cigarettes   • Smokeless tobacco: Never Used   Substance and Sexual Activity   • Alcohol use: Yes     Alcohol/week: 1.0 standard drinks     Types: 1 Shots of liquor per week     Comment: OCCASIONAL   • Drug use: No   • Sexual activity: Defer     Family History   Problem Relation Age of Onset   •  "Hypertension Mother    • Stroke Mother    • Thyroid disease Mother    • Heart disease Father    • Hypertension Father    • Cancer Sister    • Diabetes Sister    • No Known Problems Maternal Grandmother    • No Known Problems Maternal Grandfather    • No Known Problems Paternal Grandmother    • No Known Problems Paternal Grandfather    • Diabetes Sister    • No Known Problems Sister    • No Known Problems Sister      Review of Systems   Cardiovascular: Positive for dyspnea on exertion. Negative for chest pain, irregular heartbeat, leg swelling, near-syncope, orthopnea, palpitations, paroxysmal nocturnal dyspnea and syncope.   Musculoskeletal: Positive for back pain, muscle cramps (legs and back) and muscle weakness (bilateral arms with tightness). Negative for falls and neck pain.   Neurological: Positive for dizziness, light-headedness, numbness (can wake up with right numbness) and paresthesias. Negative for weakness.      No other significant symptoms reported.    Objective:     Vitals:    03/31/20 1302   Weight: 65.8 kg (145 lb)   Height: 165.1 cm (65\")     Body mass index is 24.13 kg/m².  She is unable to provide heart rate or blood pressure for today.  I have asked her to start periodically monitoring and she is able to do this at work.  Fortunately January 15 2020 PCP office visit herBlood pressure at that visit was 118/74, heart rate 71, temperature 98.6, respiratory rate 16, oxygen saturation 99%    PHYSICAL EXAM:  Physical Exam Unable to perform due to telephone encounter.    Procedures Unable to perform due to telephone encounter.      Most recent lab review ([facility]):1/15/2020 PCP labs: Hemoglobin A1c mildly elevated 5.9%, lipid panel under reasonable control LDL little above goal of 70 at 84, HDL good 55, total cholesterol 158, triglycerides 97, CMP within normal limits    7/22/19 CBC essentially normal  8/2018 amylase and lipase normal,     5/2018 hep C antibody negative, TSH normal    Assessment:       " Diagnosis Plan   1. Arm heaviness     2. Numbness and tingling of right arm     3. Coronary artery disease involving native coronary artery of native heart without angina pectoris     4. Hypertension, unspecified type     5. Hyperlipidemia, unspecified hyperlipidemia type     6. History of coronary artery stent placement     7. Tobacco abuse         Plan:     1.  Bilateral arm heaviness/right arm numbness: She is on gabapentin for restless leg syndrome.  She denies any injuries.  This would be an atypical presentation of a cardiac issue and she states that this is not what she was experiencing prior to her previous cardiac stent.  Recommended she have a work-up with her PCP and if negative we could pursue additional cardiac testing.  2.  Coronary artery disease: I am not sure when her last cardiac catheterization was as I do not have these records at this time( though I have requested them).  She does have a history of stenting to the proximal LAD in the distant past which she believes was at least over 5 years ago.  She had been maintained on aspirin and Plavix therapy.  Her current symptoms are different than what she was experiencing prior to the stent which she reported as horrible heartburn and she declines those symptoms at this time.  3.  Hypertension: No blood pressure reading to give at this time.  According to her records her last couple blood pressure checks appear to have been stable.  4.  Hyperlipidemia: On simvastatin with lipid panel under reasonable control January 2020.  Ideally her goal LDL would be less than 70.  5.  Carotid artery disease: 60 to 70% bilateral carotid artery stenosis April 2019.  She follows with Dr. Salter and has a yearly appointment in May.  6.  GERD/IBS  7.  Nicotine dependence/tobacco abuse: Patient was counseled for 3 minutes regarding the importance of smoking cessation in regards to her health as she already has known cardio and carotid vascular disease.  She has been  smoking a pack per day for a number of years.  She demonstrates understanding and will try to make it a priority to quit.  8.  History of palpitations/premature atrial contractions: Currently denies at this time.    Her symptoms sound atypical for a cardiac issue and are different than her previous anginal symptoms.  I have recommended she follow-up with her PCP which she has an appointment scheduled for 4/15/2020 for further work-up.  I also recommended she contact Dr. Salter's office if she does have known at least moderate carotid artery stenosis.  I will make sure to review her previous cardiac catheterization and any follow-up cardiac testing and make further recommendations from there.        Follow up with Dr. Moon on 5/5/2020 (unsure if this will be in office or via telehealth but will determine closer to time and patient is aware) This is unless she needs sooner evaluation based on progression of symptoms and what her PCP finds during her work up.  I advised the patient to contact our office with any questions or concerns.      I have reviewed this case with Dr. Moon. It has been a pleasure to participate in this patient's care. Please feel free to contact me with any questions or concerns.     MARCELLE Soriano  03/31/2020             Your medication list           Accurate as of March 31, 2020  2:31 PM. If you have any questions, ask your nurse or doctor.               CHANGE how you take these medications      Instructions Last Dose Given Next Dose Due   raNITIdine 150 MG tablet  Commonly known as:  ZANTAC  What changed:  Another medication with the same name was removed. Continue taking this medication, and follow the directions you see here.  Changed by:  MARCELLE Soriano      Take 300 mg by mouth 2 (Two) Times a Day.          CONTINUE taking these medications      Instructions Last Dose Given Next Dose Due   alendronate 70 MG tablet  Commonly known as:  FOSAMAX      Take 70 mg by mouth  Every 7 (Seven) Days.       aspirin 81 MG tablet      Take 1 tablet by mouth daily.       busPIRone 10 MG tablet  Commonly known as:  BUSPAR      Take 10 mg by mouth 2 (Two) Times a Day.       cetirizine 10 MG tablet  Commonly known as:  zyrTEC      Take 10 mg by mouth.       clopidogrel 75 MG tablet  Commonly known as:  PLAVIX      TAKE 1 TABLET BY MOUTH ONCE DAILY       Coenzyme Q10 10 MG capsule      Take  by mouth.       escitalopram 20 MG tablet  Commonly known as:  LEXAPRO      Take 20 mg by mouth Daily.       gabapentin 400 MG capsule  Commonly known as:  NEURONTIN      Take 2 capsules by mouth Daily.       lisinopril 5 MG tablet  Commonly known as:  PRINIVIL,ZESTRIL      Take 1 tablet by mouth daily.       montelukast 10 MG tablet  Commonly known as:  SINGULAIR      Take 10 mg by mouth Every Night.       ondansetron 8 MG tablet  Commonly known as:  ZOFRAN      TAKE 1 TABLET BY MOUTH EVERY 8 HOURS AS NEEDED FOR NAUSEA FOR UP TO 7 DAYS       simvastatin 40 MG tablet  Commonly known as:  ZOCOR      Take 1 tablet by mouth daily.       vitamin D 1.25 MG (08655 UT) capsule capsule  Commonly known as:  ERGOCALCIFEROL      50,000 Units. EVERY Monday AND FRIDAY          STOP taking these medications    cyclobenzaprine 10 MG tablet  Commonly known as:  FLEXERIL  Stopped by:  MARCELLE Soriano        dicyclomine 10 MG capsule  Commonly known as:  BENTYL  Stopped by:  MARCELLE Soriano        famotidine 40 MG tablet  Commonly known as:  PEPCID  Stopped by:  MARCELLE Soriano        fluticasone 50 MCG/ACT nasal spray  Commonly known as:  FLONASE  Stopped by:  MARCELLE Soriano        Ventolin  (90 Base) MCG/ACT inhaler  Generic drug:  albuterol sulfate HFA  Stopped by:  MARCELLE Soriano               The above medication changes may not have been made by this provider.  Medication list was updated to reflect medications patient is currently taking including medication changes and discontinuations  made by other healthcare providers.     Dictated utilizing Dragon Dictation System.

## 2020-04-07 ENCOUNTER — TELEPHONE (OUTPATIENT)
Dept: CARDIOLOGY | Facility: CLINIC | Age: 60
End: 2020-04-07

## 2020-05-13 ENCOUNTER — OFFICE VISIT (OUTPATIENT)
Dept: CARDIOLOGY | Facility: CLINIC | Age: 60
End: 2020-05-13

## 2020-05-13 VITALS
HEIGHT: 65 IN | WEIGHT: 152.8 LBS | SYSTOLIC BLOOD PRESSURE: 138 MMHG | DIASTOLIC BLOOD PRESSURE: 82 MMHG | BODY MASS INDEX: 25.46 KG/M2 | HEART RATE: 69 BPM

## 2020-05-13 DIAGNOSIS — Z72.0 TOBACCO ABUSE: ICD-10-CM

## 2020-05-13 DIAGNOSIS — I25.10 CORONARY ARTERY DISEASE INVOLVING NATIVE CORONARY ARTERY OF NATIVE HEART WITHOUT ANGINA PECTORIS: Primary | ICD-10-CM

## 2020-05-13 DIAGNOSIS — I10 ESSENTIAL HYPERTENSION: ICD-10-CM

## 2020-05-13 DIAGNOSIS — Z95.5 HISTORY OF CORONARY ARTERY STENT PLACEMENT: ICD-10-CM

## 2020-05-13 DIAGNOSIS — E78.5 HYPERLIPIDEMIA, UNSPECIFIED HYPERLIPIDEMIA TYPE: ICD-10-CM

## 2020-05-13 PROCEDURE — 93000 ELECTROCARDIOGRAM COMPLETE: CPT | Performed by: INTERNAL MEDICINE

## 2020-05-13 PROCEDURE — 99214 OFFICE O/P EST MOD 30 MIN: CPT | Performed by: INTERNAL MEDICINE

## 2020-05-13 RX ORDER — CYCLOBENZAPRINE HCL 10 MG
10 TABLET ORAL EVERY 8 HOURS PRN
COMMUNITY
Start: 2020-04-03 | End: 2021-05-14

## 2020-05-13 RX ORDER — OMEPRAZOLE 40 MG/1
40 CAPSULE, DELAYED RELEASE ORAL DAILY
COMMUNITY
Start: 2020-04-20

## 2020-05-13 RX ORDER — HYDROCODONE BITARTRATE AND ACETAMINOPHEN 5; 325 MG/1; MG/1
TABLET ORAL
COMMUNITY
Start: 2020-04-20 | End: 2021-05-14

## 2020-06-11 ENCOUNTER — APPOINTMENT (OUTPATIENT)
Dept: WOMENS IMAGING | Facility: HOSPITAL | Age: 60
End: 2020-06-11

## 2020-06-11 PROCEDURE — 77067 SCR MAMMO BI INCL CAD: CPT | Performed by: RADIOLOGY

## 2020-06-11 PROCEDURE — 77063 BREAST TOMOSYNTHESIS BI: CPT | Performed by: RADIOLOGY

## 2020-06-11 PROCEDURE — 77080 DXA BONE DENSITY AXIAL: CPT | Performed by: RADIOLOGY

## 2020-08-06 ENCOUNTER — TRANSCRIBE ORDERS (OUTPATIENT)
Dept: ADMINISTRATIVE | Facility: HOSPITAL | Age: 60
End: 2020-08-06

## 2020-08-06 ENCOUNTER — HOSPITAL ENCOUNTER (OUTPATIENT)
Dept: GENERAL RADIOLOGY | Facility: HOSPITAL | Age: 60
Discharge: HOME | End: 2020-08-06
Admitting: PODIATRIST

## 2020-08-06 DIAGNOSIS — M20.21 HALLUX RIGIDUS, BILATERAL: ICD-10-CM

## 2020-08-06 DIAGNOSIS — M20.22 HALLUX RIGIDUS, BILATERAL: ICD-10-CM

## 2020-08-06 DIAGNOSIS — M20.21 HALLUX RIGIDUS OF RIGHT FOOT: ICD-10-CM

## 2020-08-06 DIAGNOSIS — M20.21 ACQUIRED HALLUX RIGIDUS OF RIGHT FOOT: Primary | ICD-10-CM

## 2020-08-06 DIAGNOSIS — M20.21 ACQUIRED HALLUX RIGIDUS OF RIGHT FOOT: ICD-10-CM

## 2020-08-06 PROCEDURE — 73630 X-RAY EXAM OF FOOT: CPT

## 2020-11-19 RX ORDER — CLOPIDOGREL BISULFATE 75 MG/1
TABLET ORAL
Qty: 90 TABLET | Refills: 1 | Status: SHIPPED | OUTPATIENT
Start: 2020-11-19 | End: 2021-06-08

## 2021-02-17 ENCOUNTER — TELEPHONE (OUTPATIENT)
Dept: CARDIOLOGY | Facility: CLINIC | Age: 61
End: 2021-02-17

## 2021-02-17 NOTE — TELEPHONE ENCOUNTER
Pt is scheduled to have bone spurs removed from her foot on 2/16/21. You last saw her on 5/13/2020.  Does pt need appt with you for surgery clearance?    Thanks,    Thank you,    Makenzie Jones, CMA

## 2021-02-17 NOTE — TELEPHONE ENCOUNTER
As long as she is not having any new issues she can proceed with surgery without an office visit prior.

## 2021-02-18 NOTE — TELEPHONE ENCOUNTER
Lab Results   Component Value Date    HGBA1C 7 7 (H) 06/04/2019     I explained to patient the goals of blood sugar levels  fasting  should be bellow 130 and after 2 hrs after meals 150 or less  Education given verbally and with written materials  Change of life style modification again stressed  The vascular complications secondary to diabetes poor control were explained with details  The renal function protection and the prevention of major vascular disease with the use of  ARB's and statins respectively and exercise/diet was also discussed  Letter for surgery clearance faxed to Dr. Bradshaw [phone]

## 2021-05-14 ENCOUNTER — OFFICE VISIT (OUTPATIENT)
Dept: CARDIOLOGY | Facility: CLINIC | Age: 61
End: 2021-05-14

## 2021-05-14 VITALS
SYSTOLIC BLOOD PRESSURE: 120 MMHG | DIASTOLIC BLOOD PRESSURE: 82 MMHG | HEART RATE: 78 BPM | WEIGHT: 156 LBS | BODY MASS INDEX: 25.99 KG/M2 | HEIGHT: 65 IN

## 2021-05-14 DIAGNOSIS — I10 ESSENTIAL HYPERTENSION: ICD-10-CM

## 2021-05-14 DIAGNOSIS — Z95.5 HISTORY OF CORONARY ARTERY STENT PLACEMENT: ICD-10-CM

## 2021-05-14 DIAGNOSIS — Z72.0 TOBACCO ABUSE: ICD-10-CM

## 2021-05-14 DIAGNOSIS — I25.10 CORONARY ARTERY DISEASE INVOLVING NATIVE CORONARY ARTERY OF NATIVE HEART WITHOUT ANGINA PECTORIS: Primary | ICD-10-CM

## 2021-05-14 DIAGNOSIS — E78.5 HYPERLIPIDEMIA, UNSPECIFIED HYPERLIPIDEMIA TYPE: ICD-10-CM

## 2021-05-14 PROCEDURE — 99214 OFFICE O/P EST MOD 30 MIN: CPT | Performed by: INTERNAL MEDICINE

## 2021-05-14 PROCEDURE — 93000 ELECTROCARDIOGRAM COMPLETE: CPT | Performed by: INTERNAL MEDICINE

## 2021-05-14 RX ORDER — ROSUVASTATIN CALCIUM 10 MG/1
40 TABLET, COATED ORAL DAILY
COMMUNITY

## 2021-05-14 NOTE — PROGRESS NOTES
Subjective:     Encounter Date:05/14/2021      Patient ID: Chiquita Hebert is a 60 y.o. female.    Chief Complaint:  History of Present Illness    This is a 60-year-old with a history of coronary artery disease status post prior LAD stent placement, hypertension, hyperlipidemia, tobacco use, who presents for follow-up.     She presents today for annual follow-up.  Overall she has been doing well.  She denies any further episodes of bilateral arm heaviness.  Denies any chest pain, shortness of breath, palpitations, orthopnea, near-syncope or syncope, or lower extremity edema.  She unfortunately continues to smoke.    Prior History:  Patient was previously followed by Dr. Dodd.  She has a remote history of and a proximal LAD stent placement which was placed at [facility] several years ago.  She reports at that time she had symptoms of substernal chest burning with exertion.  She subsequently underwent a stress test which was abnormal which then led to a cardiac catheterization and stent placement.  Her last cardiac work-up here included an echocardiogram in 5/2018 which showed normal left ventricular systolic function wall motion with an EF of 59%, normal diastolic function, and no valvular disease.  She has done pretty well and follow-ups.  The main issue is been lifestyle modifications including smoking cessation.  She has been tried on Chantix in the past which she reports had worked initially but then she started smoking again.  She was tried on Chantix again in the last couple years but the medication was too expensive.  She was last seen in the office by Dr. Dodd in 4/2018.  She was then seen by BUCKY Teran in 4/2019.  At that time they again discussed lifestyle modifications including heart healthy diet and tobacco cessation.     Her last follow-up was with MARCELLE Diaz on 3/31/2020 for complaints of bilateral arm heaviness.  She admitted that the symptoms were different from her prior anginal  symptoms.  Symptoms occurred at any time and were not necessarily associated with exertion.  Due to the atypical sounding symptoms is recommended that we would just monitor her symptoms at that time.     I saw her initially in 5/2020 for routine follow-up.  Since her prior office visit she had only had one episode of bilateral arm heaviness.  Since her symptoms were improving I recommended just monitoring them at that time.      Review of Systems   Constitutional: Negative for malaise/fatigue.   HENT: Negative for hearing loss, hoarse voice, nosebleeds and sore throat.    Eyes: Negative for pain.   Cardiovascular: Negative for chest pain, claudication, cyanosis, dyspnea on exertion, irregular heartbeat, leg swelling, near-syncope, orthopnea, palpitations, paroxysmal nocturnal dyspnea and syncope.   Respiratory: Negative for shortness of breath and snoring.    Endocrine: Negative for cold intolerance, heat intolerance, polydipsia, polyphagia and polyuria.   Skin: Negative for itching and rash.   Musculoskeletal: Negative for arthritis, falls, joint pain, joint swelling, muscle cramps, muscle weakness and myalgias.   Gastrointestinal: Negative for constipation, diarrhea, dysphagia, heartburn, hematemesis, hematochezia, melena, nausea and vomiting.   Genitourinary: Negative for frequency, hematuria and hesitancy.   Neurological: Negative for excessive daytime sleepiness, dizziness, headaches, light-headedness, numbness and weakness.   Psychiatric/Behavioral: Negative for depression. The patient is not nervous/anxious.          Current Outpatient Medications:   •  alendronate (FOSAMAX) 70 MG tablet, Take 70 mg by mouth Every 7 (Seven) Days., Disp: , Rfl:   •  aspirin 81 MG tablet, Take 1 tablet by mouth daily., Disp: , Rfl:   •  busPIRone (BUSPAR) 10 MG tablet, Take 10 mg by mouth 2 (Two) Times a Day., Disp: , Rfl:   •  cetirizine (zyrTEC) 10 MG tablet, Take 10 mg by mouth., Disp: , Rfl:   •  clopidogrel (PLAVIX) 75 MG  tablet, Take 1 tablet by mouth once daily, Disp: 90 tablet, Rfl: 1  •  Coenzyme Q10 10 MG capsule, Take  by mouth., Disp: , Rfl:   •  escitalopram (LEXAPRO) 20 MG tablet, Take 20 mg by mouth Daily., Disp: , Rfl:   •  gabapentin (NEURONTIN) 400 MG capsule, Take 2 capsules by mouth Daily., Disp: , Rfl:   •  lisinopril (PRINIVIL,ZESTRIL) 5 MG tablet, Take 1 tablet by mouth daily., Disp: , Rfl:   •  montelukast (SINGULAIR) 10 MG tablet, Take 10 mg by mouth Every Night., Disp: , Rfl:   •  omeprazole (priLOSEC) 40 MG capsule, Take 40 mg by mouth Daily., Disp: , Rfl:   •  ondansetron (ZOFRAN) 8 MG tablet, TAKE 1 TABLET BY MOUTH EVERY 8 HOURS AS NEEDED FOR NAUSEA FOR UP TO 7 DAYS, Disp: , Rfl:   •  rosuvastatin (CRESTOR) 10 MG tablet, Take 40 mg by mouth Daily., Disp: , Rfl:   •  vitamin D (ERGOCALCIFEROL) 81011 UNITS capsule capsule, 50,000 Units. EVERY Monday AND FRIDAY, Disp: , Rfl:     Past Medical History:   Diagnosis Date   • Anxiety    • Chest pain    • Colon polyp    • Constipation    • Fatigue    • Gastritis    • GERD (gastroesophageal reflux disease)    • H/O esophagogastroduodenoscopy 01/30/2010    Diagnostic    • Heart disease    • Heart murmur    • Heart palpitations    • Hyperlipidemia    • Hypertension        Past Surgical History:   Procedure Laterality Date   • ANGIOPLASTY      Cath Stent Placement.  Stenting of the LAD   • CARDIAC CATHETERIZATION     • COLONOSCOPY N/A 12/27/2017    Procedure: COLONOSCOPY WITH BIOPSIES; POLYPECTOMY;  Surgeon: Joceline Singh MD;  Location: [facility];  Service:    • CORONARY ANGIOPLASTY     • CORONARY STENT PLACEMENT     • HYSTERECTOMY     • UPPER GASTROINTESTINAL ENDOSCOPY         Family History   Problem Relation Age of Onset   • Hypertension Mother    • Stroke Mother    • Thyroid disease Mother    • Heart disease Father    • Hypertension Father    • Cancer Sister    • Diabetes Sister    • No Known Problems Maternal Grandmother    • No Known Problems Maternal Grandfather   "  • No Known Problems Paternal Grandmother    • No Known Problems Paternal Grandfather    • Diabetes Sister    • No Known Problems Sister    • No Known Problems Sister        Social History     Tobacco Use   • Smoking status: Current Every Day Smoker     Packs/day: 1.00     Years: 25.00     Pack years: 25.00     Types: Cigarettes   • Smokeless tobacco: Never Used   • Tobacco comment: CAFFEINE USE: 3 DIET PEPSI'S DAILY   Substance Use Topics   • Alcohol use: Yes     Alcohol/week: 1.0 standard drinks     Types: 1 Shots of liquor per week     Comment: OCCASIONAL   • Drug use: No         ECG 12 Lead    Date/Time: 5/14/2021 6:20 PM  Performed by: Gwen Moon MD  Authorized by: Gwen Moon MD   Comparison: compared with previous ECG   Similar to previous ECG  Rhythm: sinus rhythm  Ectopy: atrial premature contractions               Objective:     Visit Vitals  /82 (BP Location: Right arm, Patient Position: Sitting, Cuff Size: Adult)   Pulse 78   Ht 165.1 cm (65\")   Wt 70.8 kg (156 lb)   BMI 25.96 kg/m²         Constitutional:       Appearance: Normal appearance. Well-developed.   Eyes:      General: Lids are normal.      Conjunctiva/sclera: Conjunctivae normal.      Pupils: Pupils are equal, round, and reactive to light.   HENT:      Head: Normocephalic and atraumatic.   Neck:      Vascular: No carotid bruit or JVD.      Lymphadenopathy: No cervical adenopathy.   Pulmonary:      Effort: Pulmonary effort is normal.      Breath sounds: Normal breath sounds.   Cardiovascular:      Normal rate. Regular rhythm.      No gallop.   Pulses:     Radial: 2+ bilaterally.  Edema:     Peripheral edema absent.   Abdominal:      Palpations: Abdomen is soft.   Musculoskeletal:      Cervical back: Full passive range of motion without pain, normal range of motion and neck supple. Skin:     General: Skin is warm and dry.   Neurological:      Mental Status: Alert and oriented to person, place, and time.             Assessment: "          Diagnosis Plan   1. Coronary artery disease involving native coronary artery of native heart without angina pectoris     2. History of coronary artery stent placement     3. Essential hypertension     4. Hyperlipidemia, unspecified hyperlipidemia type     5. Tobacco abuse            Plan:         1.  Coronary artery disease.  History stable and asymptomatic.  Continue current medical management.  2.  Hypertension.  Well-controlled on her current regimen medications.  Continue the same.  3.  Hyperlipidemia.  Her most recent lipid panel showed that her lipids were not at goal with an LDL has risen up to 122.  She has already been switched from simvastatin to rosuvastatin 40 mg per MARCELLE Polanco.  I agree with this change.  4.  Tobacco use.  Continue to  the patient on the importance of smoking cessation.    We will plan on seeing the patient back again in 1 year or sooner if further issues arise.

## 2021-06-08 RX ORDER — CLOPIDOGREL BISULFATE 75 MG/1
TABLET ORAL
Qty: 90 TABLET | Refills: 3 | Status: SHIPPED | OUTPATIENT
Start: 2021-06-08 | End: 2022-06-09 | Stop reason: SDUPTHER

## 2022-01-05 ENCOUNTER — PREP FOR SURGERY (OUTPATIENT)
Dept: SURGERY | Facility: SURGERY CENTER | Age: 62
End: 2022-01-05

## 2022-01-05 ENCOUNTER — OFFICE VISIT (OUTPATIENT)
Dept: PAIN MEDICINE | Facility: CLINIC | Age: 62
End: 2022-01-05

## 2022-01-05 VITALS
OXYGEN SATURATION: 97 % | SYSTOLIC BLOOD PRESSURE: 120 MMHG | HEART RATE: 69 BPM | HEIGHT: 65 IN | DIASTOLIC BLOOD PRESSURE: 55 MMHG | WEIGHT: 155.4 LBS | TEMPERATURE: 96.6 F | BODY MASS INDEX: 25.89 KG/M2

## 2022-01-05 DIAGNOSIS — M47.26 OSTEOARTHRITIS OF SPINE WITH RADICULOPATHY, LUMBAR REGION: ICD-10-CM

## 2022-01-05 DIAGNOSIS — M54.12 RIGHT CERVICAL RADICULOPATHY: ICD-10-CM

## 2022-01-05 DIAGNOSIS — G89.4 CHRONIC PAIN SYNDROME: Primary | ICD-10-CM

## 2022-01-05 DIAGNOSIS — M54.12 RIGHT CERVICAL RADICULOPATHY: Primary | ICD-10-CM

## 2022-01-05 PROCEDURE — 99204 OFFICE O/P NEW MOD 45 MIN: CPT | Performed by: ANESTHESIOLOGY

## 2022-01-05 RX ORDER — MELOXICAM 15 MG/1
15 TABLET ORAL DAILY
COMMUNITY

## 2022-01-05 RX ORDER — BUPROPION HYDROCHLORIDE 300 MG/1
300 TABLET ORAL DAILY
COMMUNITY

## 2022-01-05 RX ORDER — DICYCLOMINE HYDROCHLORIDE 10 MG/1
10 CAPSULE ORAL
COMMUNITY

## 2022-01-05 RX ORDER — SODIUM CHLORIDE 0.9 % (FLUSH) 0.9 %
10 SYRINGE (ML) INJECTION EVERY 12 HOURS SCHEDULED
Status: CANCELLED | OUTPATIENT
Start: 2022-01-05

## 2022-01-05 RX ORDER — SODIUM CHLORIDE 0.9 % (FLUSH) 0.9 %
10 SYRINGE (ML) INJECTION AS NEEDED
Status: CANCELLED | OUTPATIENT
Start: 2022-01-05

## 2022-01-05 RX ORDER — LANOLIN ALCOHOL/MO/W.PET/CERES
1000 CREAM (GRAM) TOPICAL DAILY
COMMUNITY

## 2022-01-05 RX ORDER — MULTIPLE VITAMINS W/ MINERALS TAB 9MG-400MCG
1 TAB ORAL DAILY
COMMUNITY

## 2022-01-05 RX ORDER — BUPROPION HYDROCHLORIDE 150 MG/1
150 TABLET, EXTENDED RELEASE ORAL DAILY
COMMUNITY

## 2022-01-05 RX ORDER — MELATONIN
1000 DAILY
COMMUNITY

## 2022-01-05 RX ORDER — SODIUM CHLORIDE, SODIUM LACTATE, POTASSIUM CHLORIDE, CALCIUM CHLORIDE 600; 310; 30; 20 MG/100ML; MG/100ML; MG/100ML; MG/100ML
9 INJECTION, SOLUTION INTRAVENOUS CONTINUOUS PRN
Status: CANCELLED | OUTPATIENT
Start: 2022-01-05

## 2022-01-05 NOTE — PROGRESS NOTES
CHIEF COMPLAINT  Patient c/o back and neck pain that she has had for 2-3 months. She describes the neck pain as aching that radiates down her right arm. She has some numbness in her right arm. She describes the back pain as aching that radiates into her bilateral legs when she walks to much. She has not had PT. She is currently using gabapentin and methocarbamol which helos her pain some.     Subjective   Chiquita Hebert is a 61 y.o. female.   She presents to the office for evaluation of neck and back pain. She was referred here by BUCKY Lew.         Back Pain  This is a chronic problem. The current episode started more than 1 year ago. The problem occurs daily. The problem is unchanged. The pain is present in the lumbar spine. The quality of the pain is described as aching. The pain radiates to the left thigh and right thigh. The pain is moderate. The symptoms are aggravated by bending, standing and twisting. Associated symptoms include numbness and weakness. Pertinent negatives include no abdominal pain, chest pain, dysuria, fever or headaches. Risk factors include menopause. She has tried analgesics, heat, home exercises, ice, NSAIDs and walking for the symptoms.   Neck Pain   This is a chronic problem. The current episode started more than 1 year ago. The problem occurs constantly. The problem has been gradually worsening. The pain is present in the right side and midline. The quality of the pain is described as burning and shooting. The pain is moderate. The symptoms are aggravated by bending, position, stress and twisting. Associated symptoms include numbness and weakness. Pertinent negatives include no chest pain, fever or headaches. She has tried home exercises, heat, ice, NSAIDs, acetaminophen and muscle relaxants for the symptoms.        PEG Assessment   What number best describes your pain on average in the past week?9  What number best describes how, during the past week, pain has interfered with  your enjoyment of life?0  What number best describes how, during the past week, pain has interfered with your general activity?  9    --  The aforementioned information the Chief Complaint section and above subjective data including any HPI data, and also the Review of Systems data, has been personally reviewed and affirmed.  --        Current Outpatient Medications:   •  aspirin 81 MG tablet, Take 1 tablet by mouth daily., Disp: , Rfl:   •  buPROPion SR (WELLBUTRIN SR) 150 MG 12 hr tablet, Take 150 mg by mouth 2 (Two) Times a Day., Disp: , Rfl:   •  buPROPion XL (WELLBUTRIN XL) 300 MG 24 hr tablet, Take 300 mg by mouth Daily., Disp: , Rfl:   •  busPIRone (BUSPAR) 10 MG tablet, Take 10 mg by mouth 2 (Two) Times a Day., Disp: , Rfl:   •  cetirizine (zyrTEC) 10 MG tablet, Take 10 mg by mouth., Disp: , Rfl:   •  cholecalciferol (VITAMIN D3) 25 MCG (1000 UT) tablet, Take 1,000 Units by mouth Daily., Disp: , Rfl:   •  clopidogrel (PLAVIX) 75 MG tablet, Take 1 tablet by mouth once daily, Disp: 90 tablet, Rfl: 3  •  Coenzyme Q10 10 MG capsule, Take  by mouth., Disp: , Rfl:   •  dicyclomine (BENTYL) 10 MG capsule, Take 10 mg by mouth 4 (Four) Times a Day Before Meals & at Bedtime., Disp: , Rfl:   •  escitalopram (LEXAPRO) 20 MG tablet, Take 20 mg by mouth Daily., Disp: , Rfl:   •  gabapentin (NEURONTIN) 400 MG capsule, Take 2 capsules by mouth Daily., Disp: , Rfl:   •  lisinopril (PRINIVIL,ZESTRIL) 5 MG tablet, Take 1 tablet by mouth daily., Disp: , Rfl:   •  meloxicam (MOBIC) 15 MG tablet, Take 15 mg by mouth Daily., Disp: , Rfl:   •  montelukast (SINGULAIR) 10 MG tablet, Take 10 mg by mouth Every Night., Disp: , Rfl:   •  multivitamin with minerals tablet tablet, Take 1 tablet by mouth Daily., Disp: , Rfl:   •  omeprazole (priLOSEC) 40 MG capsule, Take 40 mg by mouth Daily., Disp: , Rfl:   •  ondansetron (ZOFRAN) 8 MG tablet, TAKE 1 TABLET BY MOUTH EVERY 8 HOURS AS NEEDED FOR NAUSEA FOR UP TO 7 DAYS, Disp: , Rfl:   •   rosuvastatin (CRESTOR) 10 MG tablet, Take 40 mg by mouth Daily., Disp: , Rfl:   •  vitamin B-12 (CYANOCOBALAMIN) 1000 MCG tablet, Take 1,000 mcg by mouth Daily., Disp: , Rfl:   •  alendronate (FOSAMAX) 70 MG tablet, Take 70 mg by mouth Every 7 (Seven) Days., Disp: , Rfl:   •  vitamin D (ERGOCALCIFEROL) 62676 UNITS capsule capsule, 50,000 Units. EVERY Monday AND FRIDAY, Disp: , Rfl:     The following portions of the patient's history were reviewed and updated as appropriate: allergies, current medications, past family history, past medical history, past social history, past surgical history and problem list.    -------    The following portions of the patient's history were reviewed and updated as appropriate: allergies, current medications, past family history, past medical history, past social history, past surgical history and problem list.    Allergies   Allergen Reactions   • Penicillins Rash       Current Outpatient Medications on File Prior to Visit   Medication Sig Dispense Refill   • aspirin 81 MG tablet Take 1 tablet by mouth daily.     • buPROPion SR (WELLBUTRIN SR) 150 MG 12 hr tablet Take 150 mg by mouth 2 (Two) Times a Day.     • buPROPion XL (WELLBUTRIN XL) 300 MG 24 hr tablet Take 300 mg by mouth Daily.     • busPIRone (BUSPAR) 10 MG tablet Take 10 mg by mouth 2 (Two) Times a Day.     • cetirizine (zyrTEC) 10 MG tablet Take 10 mg by mouth.     • cholecalciferol (VITAMIN D3) 25 MCG (1000 UT) tablet Take 1,000 Units by mouth Daily.     • clopidogrel (PLAVIX) 75 MG tablet Take 1 tablet by mouth once daily 90 tablet 3   • Coenzyme Q10 10 MG capsule Take  by mouth.     • dicyclomine (BENTYL) 10 MG capsule Take 10 mg by mouth 4 (Four) Times a Day Before Meals & at Bedtime.     • escitalopram (LEXAPRO) 20 MG tablet Take 20 mg by mouth Daily.     • gabapentin (NEURONTIN) 400 MG capsule Take 2 capsules by mouth Daily.     • lisinopril (PRINIVIL,ZESTRIL) 5 MG tablet Take 1 tablet by mouth daily.     • meloxicam  (MOBIC) 15 MG tablet Take 15 mg by mouth Daily.     • montelukast (SINGULAIR) 10 MG tablet Take 10 mg by mouth Every Night.     • multivitamin with minerals tablet tablet Take 1 tablet by mouth Daily.     • omeprazole (priLOSEC) 40 MG capsule Take 40 mg by mouth Daily.     • ondansetron (ZOFRAN) 8 MG tablet TAKE 1 TABLET BY MOUTH EVERY 8 HOURS AS NEEDED FOR NAUSEA FOR UP TO 7 DAYS     • rosuvastatin (CRESTOR) 10 MG tablet Take 40 mg by mouth Daily.     • vitamin B-12 (CYANOCOBALAMIN) 1000 MCG tablet Take 1,000 mcg by mouth Daily.     • alendronate (FOSAMAX) 70 MG tablet Take 70 mg by mouth Every 7 (Seven) Days.     • vitamin D (ERGOCALCIFEROL) 07932 UNITS capsule capsule 50,000 Units. EVERY Monday AND FRIDAY       No current facility-administered medications on file prior to visit.       Patient Active Problem List   Diagnosis   • IBS (irritable bowel syndrome)   • Rectal pain   • Trigger middle finger of right hand   • History of coronary artery stent placement   • Coronary artery disease involving native coronary artery of native heart without angina pectoris   • Hyperlipidemia   • Tobacco abuse   • Heart murmur   • Abdominal pain   • Diarrhea   • Adenomatous polyp of descending colon   • Anxiety   • Carpal tunnel syndrome on both sides   • Depression   • High blood pressure   • Arm heaviness   • Numbness and tingling of right arm   • Right cervical radiculopathy       Past Medical History:   Diagnosis Date   • Anxiety    • Chest pain    • Colon polyp    • Constipation    • Fatigue    • Gastritis    • GERD (gastroesophageal reflux disease)    • H/O esophagogastroduodenoscopy 01/30/2010    Diagnostic    • Heart disease    • Heart murmur    • Heart palpitations    • Hyperlipidemia    • Hypertension        Past Surgical History:   Procedure Laterality Date   • ANGIOPLASTY      Cath Stent Placement.  Stenting of the LAD   • CARDIAC CATHETERIZATION     • COLONOSCOPY N/A 12/27/2017    Procedure: COLONOSCOPY WITH  BIOPSIES; POLYPECTOMY;  Surgeon: Joceline Singh MD;  Location: [facility];  Service:    • CORONARY ANGIOPLASTY     • CORONARY STENT PLACEMENT     • HYSTERECTOMY     • UPPER GASTROINTESTINAL ENDOSCOPY         Family History   Problem Relation Age of Onset   • Hypertension Mother    • Stroke Mother    • Thyroid disease Mother    • Heart disease Father    • Hypertension Father    • Cancer Sister    • Diabetes Sister    • No Known Problems Maternal Grandmother    • No Known Problems Maternal Grandfather    • No Known Problems Paternal Grandmother    • No Known Problems Paternal Grandfather    • Diabetes Sister    • No Known Problems Sister    • No Known Problems Sister        Social History     Socioeconomic History   • Marital status: Single   Tobacco Use   • Smoking status: Current Every Day Smoker     Packs/day: 1.00     Years: 25.00     Pack years: 25.00     Types: Cigarettes   • Smokeless tobacco: Never Used   • Tobacco comment: CAFFEINE USE: 3 DIET PEPSI'S DAILY   Substance and Sexual Activity   • Alcohol use: Yes     Alcohol/week: 1.0 standard drink     Types: 1 Shots of liquor per week     Comment: OCCASIONAL   • Drug use: No   • Sexual activity: Defer       -------      REVIEW OF PERTINENT MEDICAL DATA    E-mark report is reviewed:  I reviewed the document in the electronic form under the PDMP tab in the Epic EMR...  - In this function, the report is a current report in as close to real-time as possible.  - The report was available for immediate review.    - I did ashely the report as reviewed.  - There is not concern for aberrant behavior based on this ekasper review.    October 22, 2020 when hemoglobin A1c was 5.7.  CBC from October 20, 2021 was revealing a platelet count of 354,000.    Review of report from [facility] of the cervical spine MRI from October 20, 2021.  Smudges severe foraminal narrowing bilaterally at C5-6 and moderate to severe right greater than left foraminal narrowing at C4-5.  Mild  "cord flattening from C4-C6.  Lumbar MRI of the same date had an L4-L5 disc bulge and some minimal listhesis of L5-S1 with some mild to moderate foraminal narrowing at L5-S1.  There was facet arthropathy at L5-S1 bilaterally left greater than right.    Review of office note from October 21, 2021 for the the referring, who is BUCKY Lewis.  Their plan of care included the referral to me for considerations of interventional options including facet injections or radiofrequency ablation.    Review of Systems   Constitutional: Positive for activity change (decreased) and fatigue. Negative for chills and fever.   HENT: Negative for congestion.    Eyes: Negative for visual disturbance.   Respiratory: Negative for chest tightness and shortness of breath.    Cardiovascular: Negative for chest pain.   Gastrointestinal: Negative for abdominal pain, constipation and diarrhea.   Genitourinary: Negative for difficulty urinating, dyspareunia and dysuria.   Musculoskeletal: Positive for back pain and neck pain.   Neurological: Positive for weakness and numbness. Negative for dizziness, light-headedness and headaches.   Psychiatric/Behavioral: Positive for agitation. Negative for self-injury, sleep disturbance and suicidal ideas. The patient is nervous/anxious (anxious).          Vitals:    01/05/22 1457   BP: 120/55   Pulse: 69   Temp: 96.6 °F (35.9 °C)   SpO2: 97%   Weight: 70.5 kg (155 lb 6.4 oz)   Height: 165.1 cm (65\")   PainSc:   8   PainLoc: Back         Objective   Physical Exam  Vitals and nursing note reviewed.   Constitutional:       General: She is not in acute distress.     Appearance: Normal appearance. She is well-developed. She is not toxic-appearing.   HENT:      Head: Normocephalic and atraumatic.      Right Ear: Hearing and external ear normal.      Left Ear: Hearing and external ear normal.      Nose: Nose normal.   Eyes:      General: Lids are normal.      Conjunctiva/sclera: Conjunctivae normal.      " Pupils: Pupils are equal, round, and reactive to light.   Pulmonary:      Effort: Pulmonary effort is normal. No respiratory distress.   Musculoskeletal:      Cervical back: No deformity, rigidity or spasms. Decreased range of motion (Pain when she rotates past 60 degrees to the right.).      Lumbar back: Bony tenderness (Tender over the mid lower lumbar facets.) present. No swelling or edema. Decreased range of motion. Negative right straight leg raise test and negative left straight leg raise test.      Comments: Spurling maneuver positive to the right.  She does have some lumbar loading positivity and pain on extension of the low back.   Neurological:      Mental Status: She is alert and oriented to person, place, and time.      Cranial Nerves: Cranial nerves are intact. No cranial nerve deficit.      Sensory: Sensation is intact.      Motor: No weakness, tremor or atrophy.      Coordination: Coordination is intact.      Gait: Gait is intact.      Deep Tendon Reflexes:      Reflex Scores:       Bicep reflexes are 1+ on the right side and 2+ on the left side.       Brachioradialis reflexes are 1+ on the right side and 2+ on the left side.       Patellar reflexes are 1+ on the right side and 1+ on the left side.  Psychiatric:         Behavior: Behavior normal.         Assessment/Plan   Diagnoses and all orders for this visit:    1. Chronic pain syndrome (Primary)    2. Right cervical radiculopathy    3. Osteoarthritis of spine with radiculopathy, lumbar region          In summary, this very pleasant 61-year-old woman is interested in interventional options to try to better manage her chronic pain.  She has been able to stop Plavix for other types of procedures in the past and we will reach out to her cardiologist are asking her to do the same to ensure that it would be okay if we expected to be acceptable.  I think that her cervical spine and lumbar spine pains are different types of pathologies, with her low back  pain being more consistent with a facet mediated pain in the radiating pattern being more consistent with a facet mediated radiation rather than a true radiculopathy, as compared to the neck pain that seems to be at classical cervical radiculopathy.  We talked about the anatomy and the different plans of care for each area.  Given educational information about epidural injections as well as medial branch blocks and RF therapies.    She is complaining of neck pain as being the her worst problem and the thing that is most debilitating because of its constant distracting nature and request that we try to treat her neck first.  We will work to get her treated soon as possible and start the insurance authorization process immediately.  Hopefully we can get the neck pain settled to have been focused on the lumbar facets.    --- Follow-up for procedure.... STACEY x2, 2-3 weeks apart    -- likely will need to consider bilateral L35 LMBB in the future also    -- hold plavix 7 days prior to each procedure    Reviewed the procedure at length with the patient.  Included in the review was expectations, complications, risk and benefits.The procedure was described in detail and the risks, benefits and alternatives were discussed with the patient (including but not limited to: bleeding, infection, nerve damage, worsening of pain, inability to perform injection, paralysis, seizures, coma, no pain relief and death) who agreed to proceed.  Discussed the potential for sedation if warranted/wanted.  The procedure will plan to be performed at [facility] with fluoroscopic guidance(unless ultrasound is indicated) and could potentially have steroids and contrast dye used. Questions were answered and in a way the patient could understand.  Patient verbalized understanding and wishes to proceed.  This intervention will be ordered.  Discussed with patient that all procedures are part of a multimodal plan of care and include  either formal PT or a home exercise program.  Patient has no evidence of coagulopathy or current infection.             ISH REPORT  ISH report has been reviewed and scanned into the patient's chart.  Date of last ISH : as above.  No current use of opioids.      --       Dictated utilizing Dragon dictation.     This document is intended for medical expert use only. Reading of this document by patients and/or patient's family without participating medical staff guidance may result in misinterpretation and unintended morbidity.   Any interpretation of such data is the responsibility of the patient and/or family member responsible for the patient in concert with their primary or specialist providers, not to be left for sources of online searches such as OpenROV, Pingboard or similar queries. Relying on these approaches to knowledge may result in misinterpretation, misguided goals of care and even death should patients or family members try recommendations outside of the realm of professional medical care in a supervised way.    --    Vitals:    01/05/22 1457   PainSc:   8   PainLoc: Back        Chiquita Hebert reports a pain score of 8.  Given her pain assessment as noted, treatment options were discussed and the following options were decided upon as a follow-up plan to address the patient's pain: educational materials on pain management and steroid injections.

## 2022-01-05 NOTE — PATIENT INSTRUCTIONS
Epidural Steroid Injection    An epidural steroid injection is a shot of steroid medicine and numbing medicine that is given into the space between the spinal cord and the bones of the back (epidural space). The shot helps relieve pain caused by an irritated or swollen nerve root.  The amount of pain relief you get from the injection depends on what is causing the nerve to be swollen and irritated, and how long your pain lasts. You are more likely to benefit from this injection if your pain is strong and comes on suddenly rather than if you have had long-term (chronic) pain.  Tell a health care provider about:  · Any allergies you have.  · All medicines you are taking, including vitamins, herbs, eye drops, creams, and over-the-counter medicines.  · Any problems you or family members have had with anesthetic medicines.  · Any blood disorders you have.  · Any surgeries you have had.  · Any medical conditions you have.  · Whether you are pregnant or may be pregnant.  What are the risks?  Generally, this is a safe procedure. However, problems may occur, including:  · Headache.  · Bleeding.  · Infection.  · Allergic reaction to medicines.  · Nerve damage.  What happens before the procedure?  Staying hydrated  Follow instructions from your health care provider about hydration, which may include:  · Up to 2 hours before the procedure - you may continue to drink clear liquids, such as water, clear fruit juice, black coffee, and plain tea.  Eating and drinking restrictions  Follow instructions from your health care provider about eating and drinking, which may include:  · 8 hours before the procedure - stop eating heavy meals or foods, such as meat, fried foods, or fatty foods.  · 6 hours before the procedure - stop eating light meals or foods, such as toast or cereal.  · 6 hours before the procedure - stop drinking milk or drinks that contain milk.  · 2 hours before the procedure - stop drinking clear  liquids.  Medicines  · You may be given medicines to lower anxiety.  · Ask your health care provider about:  ? Changing or stopping your regular medicines. This is especially important if you are taking diabetes medicines or blood thinners.  ? Taking medicines such as aspirin and ibuprofen. These medicines can thin your blood. Do not take these medicines unless your health care provider tells you to take them.  ? Taking over-the-counter medicines, vitamins, herbs, and supplements.  General instructions  · Ask your health care provider what steps will be taken to prevent infection.  · Plan to have a responsible adult take you home from the hospital or clinic.  · If you will be going home right after the procedure, plan to have a responsible adult care for you for the time you are told. This is important.  What happens during the procedure?  · An IV will be inserted into one of your veins.  · You will be given one or more of the following:  ? A medicine to help you relax (sedative).  ? A medicine to numb the area (local anesthetic).  · You will be asked to lie on your abdomen or sit.  · The injection site will be cleaned.  · A needle will be inserted through your skin into the epidural space. This may cause you some discomfort. An X-ray machine will be used to guide the needle as close as possible to the affected nerve.  · A steroid medicine and a local anesthetic will be injected into the epidural space.  · The needle and IV will be removed.  · A bandage (dressing) will be put over the injection site.  The procedure may vary among health care providers and hospitals.  What can I expect after the procedure?  · Your blood pressure, heart rate, breathing rate, and blood oxygen level will be monitored until you leave the hospital or clinic.  · Your arm or leg may feel weak or numb for a few hours.  · The injection site may feel sore.  Follow these instructions at home:  Injection site care  · You may remove the bandage  (dressing) after 24 hours.  · Check your injection site every day for signs of infection. Check for:  ? Redness, swelling, or pain.  ? Fluid or blood.  ? Warmth.  ? Pus or a bad smell.  Managing pain, stiffness, and swelling  · For 24 hours after the procedure:  ? Avoid using heat on the injection site.  ? Do not take baths, swim, or use a hot tub until your health care provider approves. Ask your health care provider if you may take showers. You may only be allowed to take sponge baths.  · If directed, put ice on the injection site. To do this:  ? Put ice in a plastic bag.  ? Place a towel between your skin and the bag.  ? Leave the ice on for 20 minutes, 2-3 times a day.    Activity  · If you were given a sedative during the procedure, it can affect you for several hours. Do not drive or operate machinery until your health care provider says that it is safe.  · Return to your normal activities as told by your health care provider. Ask your health care provider what activities are safe for you.  General instructions  · Take over-the-counter and prescription medicines only as told by your health care provider.  · Drink enough fluid to keep your urine pale yellow.  · Keep all follow-up visits as told by your health care provider. This is important.  Contact a health care provider if:  · You have any of these signs of infection:  ? Redness, swelling, or pain around your injection site.  ? Fluid or blood coming from your injection site.  ? Warmth coming from your injection site.  ? Pus or a bad smell coming from your injection site.  ? A fever.  · You continue to have pain and soreness around the injection site, even after taking over-the-counter pain medicine.  · You have severe, sudden, or lasting nausea or vomiting.  Get help right away if:  · You have severe pain at the injection site that is not relieved by medicines.  · You develop a severe headache or a stiff neck.  · You become sensitive to light.  · You have  any new numbness or weakness in your legs or arms.  · You lose control of your bladder or bowel movements.  · You have trouble breathing.  Summary  · An epidural steroid injection is a shot of steroid medicine and numbing medicine that is given into the epidural space.  · The shot helps relieve pain caused by an irritated or swollen nerve root.  · You are more likely to benefit from this injection if your pain is strong and comes on suddenly rather than if you have had chronic pain.  This information is not intended to replace advice given to you by your health care provider. Make sure you discuss any questions you have with your health care provider.  Document Revised: 04/16/2021 Document Reviewed: 06/29/2020  [employer] Patient Education © 2021 [employer]      ---        Facet Syndrome    Facet syndrome is a condition in which joints (facet joints) that connect the bones of the spine (vertebrae) become damaged. Facet joints help the spine move, and they wear down (degenerate) or become inflamed as a person gets older. This can cause pain and stiffness in the neck (cervical facet syndrome) or in the lower back (lumbar facet syndrome).  When a facet joint becomes damaged, a vertebra may slip forward, out of its normal place in the spine. Damage to a facet joint can also damage nerves near the spine, which can cause tingling or weakness in the arms or legs. Facet syndrome can make it difficult to turn the head or bend backward without pain. This condition usually gets worse over time.  What are the causes?  Common causes of this condition include:  · Age-related inflammation of the facet joints (arthritis) that may create extra bone on the joint surface (bone spurs).  · Age-related decrease in space between the vertebrae (disk degeneration and cartilage degeneration).  · Repetitive stress on the spine, such as repetitive twisting of the back.  · Injury to the back or neck.  · Poor posture.  · Being overweight or  obese.  What increases the risk?  The following factors may make you more likely to develop this condition:  · Playing contact sports.  · Doing activities or sports that involve repetitive twisting motions or repetitive heavy lifting.  · Having poor back strength and flexibility.  · Having another back or spine condition, such as scoliosis.  What are the signs or symptoms?  Symptoms of facet syndrome may include:  · An ache in the neck or lower back. This may get worse when you twist or arch your back, or when you look up.  · Stiffness in the neck or lower back.  · Numbness, tingling, or weakness in the arms or legs.  Symptoms of cervical facet syndrome may include:  · Headache.  · Pain in the back of the head.  · Pain in the shoulder blades.  Symptoms of lumbar facet syndrome may include pain in any of the following areas:  · Groin.  · Thighs.  · Lower back.  · Buttocks.  · Hips.  How is this diagnosed?  This condition may be diagnosed based on:  · Your symptoms.  · Your medical history.  · A physical exam.  · Imaging tests, such as:  ? X-rays.  ? MRI.  · A procedure in which medicines to numb the area (local anesthetic) and medicines to reduce inflammation (steroids) are injected into your affected joint (facet joint block). This helps to diagnose and may relieve pain.  How is this treated?  This condition may be treated by:  · Stopping or modifying activities that make your condition worse.  · Taking medicines that help reduce pain and inflammation.  · Having steroid injections to help reduce severe pain.  · Doing physical therapy.  · Following a weight-control plan.  · Having a procedure called radiofrequency ablation. This is a surgical procedure that uses high-frequency radio waves to block signals from affected nerves.  · Having surgery to stabilize your spine or to take pressure off your nerves. This is rare.  Follow these instructions at home:  Medicines  · Take over-the-counter and prescription medicines  only as told by your health care provider.  · Ask your health care provider if the medicine prescribed to you:  ? Requires you to avoid driving or using heavy machinery.  ? Can cause constipation. You may need to take actions to prevent or treat constipation, such as:  § Drink enough fluid to keep your urine pale yellow.  § Take over-the-counter or prescription medicines.  § Eat foods that are high in fiber, such as beans, whole grains, and fresh fruits and vegetables.  § Limit foods that are high in fat and processed sugars, such as fried or sweet foods.  Activity  · Rest your neck and back as told by your health care provider.  · Return to your normal activities as told by your health care provider. Ask your health care provider what activities are safe for you.  · If physical therapy was prescribed, do exercises as told by your health care provider.  General instructions    · Do not use any products that contain nicotine or tobacco, such as cigarettes, e-cigarettes, and chewing tobacco. These can delay healing. If you need help quitting, ask your health care provider.  · Use good posture throughout your daily activities. Good posture means that your spine is in its natural S-curve position (your spine is neutral), your shoulders are pulled back slightly, and your head is not forward.  · Maintain a healthy weight. Follow instructions from your health care provider for weight control. These may include dietary restrictions.  · Keep all follow-up visits as told by your health care provider. This is important.    Contact a health care provider if you have:  · Symptoms that get worse or do not improve in 2-4 weeks of treatment.  · New symptoms.  Get help right away if you:  · Have numbness or weakness in any part of your body.  · Lose control over your bladder or bowel functions.  Summary  · Facet syndrome is a condition in which joints (facet joints) that connect the bones of the spine (vertebrae) become damaged.  This can cause pain and stiffness in the neck (cervical facet syndrome) or in the lower back (lumbar facet syndrome).  · Rest your neck and back as told by your health care provider.  · If physical therapy was prescribed, do exercises as told by your health care provider.  · Take over-the-counter and prescription medicines only as told by your health care provider.  · Contact a health care provider if you have symptoms that get worse or do not improve in 2-4 weeks of treatment, or if you have new symptoms.  This information is not intended to replace advice given to you by your health care provider. Make sure you discuss any questions you have with your health care provider.  Document Revised: 11/26/2019 Document Reviewed: 12/01/2019  [employer] Patient Education © 2021 [employer]    ---      Facet Joint Block  The facet joints connect the bones of the spine (vertebrae). They make it possible for you to bend, twist, and make other movements with your spine. They also keep you from bending too far, twisting too far, and making other extreme movements.  A facet joint block is a procedure in which a numbing medicine (anesthetic) is injected into a facet joint. In many cases, an anti-inflammatory medicine (steroid) is also injected. A facet joint block may be done:  · To diagnose neck or back pain. If the pain gets better after a facet joint block, it means the pain is probably coming from the facet joint. If the pain does not get better, it means the pain is probably not coming from the facet joint.  · To relieve neck or back pain that is caused by an inflamed facet joint.  A facet joint block is only done to relieve pain if the pain does not improve with other methods, such as medicine, exercise programs, and physical therapy.  Tell a health care provider about:  · Any allergies you have.  · All medicines you are taking, including vitamins, herbs, eye drops, creams, and over-the-counter medicines.  · Any problems you  or family members have had with anesthetic medicines.  · Any blood disorders you have.  · Any surgeries you have had.  · Any medical conditions you have or have had.  · Whether you are pregnant or may be pregnant.  What are the risks?  Generally, this is a safe procedure. However, problems may occur, including:  · Bleeding.  · Injury to a nerve near the injection site.  · Pain at the injection site.  · Weakness or numbness in areas controlled by nerves near the injection site.  · Infection.  · Temporary fluid retention.  · Allergic reactions to medicines or dyes.  · Injury to other structures or organs near the injection site.  What happens before the procedure?  Medicines  Ask your health care provider about:  · Changing or stopping your regular medicines. This is especially important if you are taking diabetes medicines or blood thinners.  · Taking medicines such as aspirin and ibuprofen. These medicines can thin your blood. Do not take these medicines unless your health care provider tells you to take them.  · Taking over-the-counter medicines, vitamins, herbs, and supplements.  Eating and drinking  Follow instructions from your health care provider about eating and drinking, which may include:  · 8 hours before the procedure - stop eating heavy meals or foods, such as meat, fried foods, or fatty foods.  · 6 hours before the procedure - stop eating light meals or foods, such as toast or cereal.  · 6 hours before the procedure - stop drinking milk or drinks that contain milk.  · 2 hours before the procedure - stop drinking clear liquids.  Staying hydrated  Follow instructions from your health care provider about hydration, which may include:  · Up to 2 hours before the procedure - you may continue to drink clear liquids, such as water, clear fruit juice, black coffee, and plain tea.  General instructions  · Do not use any products that contain nicotine or tobacco for at least 4-6 weeks before the procedure. These  products include cigarettes, e-cigarettes, and chewing tobacco. If you need help quitting, ask your health care provider.  · Plan to have someone take you home from the hospital or clinic.  · Ask your health care provider:  ? How your surgery site will be marked.  ? What steps will be taken to help prevent infection. These may include:  § Removing hair at the surgery site.  § Washing skin with a germ-killing soap.  § Receiving antibiotic medicine.  What happens during the procedure?    · You will put on a hospital gown.  · You will lie on your stomach on an X-ray table. You may be asked to lie in a different position if an injection will be made in your neck.  · Machines will be used to monitor your oxygen levels, heart rate, and blood pressure.  · Your skin will be cleaned.  · If an injection will be made in your neck, an IV will be inserted into one of your veins. Fluids and medicine will flow directly into your body through the IV.  · A numbing medicine (local anesthetic) will be applied to your skin. Your skin may sting or burn for a moment.  · A video X-ray machine (fluoroscopy) will be used to find the joint. In some cases, a CT scan may be used.  · A contrast dye may be injected into the facet joint area to help find the joint.  · When the joint is located, an anesthetic will be injected into the joint through the needle.  · Your health care provider will ask you whether you feel pain relief.  ? If you feel relief, a steroid may be injected to provide pain relief for a longer period of time.  ? If you do not feel relief or feel only partial relief, additional injections of an anesthetic may be made in other facet joints.  · The needle will be removed.  · Your skin will be cleaned.  · A bandage (dressing) will be applied over each injection site.  The procedure may vary among health care providers and hospitals.  What happens after the procedure?  · Your blood pressure, heart rate, breathing rate, and blood  "oxygen level will be monitored until you leave the hospital or clinic.  · You will lie down and rest for a period of time.  Summary  · A facet joint block is a procedure in which a numbing medicine (anesthetic) is injected into a facet joint. An anti-inflammatory medicine (stereoid) may also be injected.  · Follow instructions from your health care provider about medicines and eating and drinking before the procedure.  · Do not use any products that contain nicotine or tobacco for at least 4-6 weeks before the procedure.  · You will lie on your stomach for the procedure, but you may be asked to lie in a different position if an injection will be made in your neck.  · When the joint is located, an anesthetic will be injected into the joint through the needle.  This information is not intended to replace advice given to you by your health care provider. Make sure you discuss any questions you have with your health care provider.  Document Revised: 04/09/2020 Document Reviewed: 11/22/2019  [employer] Patient Education © 2021 [employer]      ---    -------  Education about Medial Branch Blockade and RF Therapy:    This medial branch blockade (MBB) suggested is intended for diagnostic purposes, with the intent of offering the patient Radiofrequency thermal rhizotomy (RF) if the MBB is diagnostically effective.  The diagnostic blockade is necessary to determine the likelihood that RF therapy could be efficacious in providing long term relief to the patient.    Medial branches are sensory nerve branches that connect to a facet joint and transmit sensations & pain signals from that joint.  Facet is a term for the type of joints found in the spine.  Medial branches are the nerves that go to a facet, and therefore are also sometimes called \"facet joint nerves\" (FJNs).      In a medial branch blockade procedure, xray fluoroscopy is used to verify the locations of the outside of the joint lines which are being targeted.  Under " xray guidance, needles are placed to these areas.  Contrast dye is injected to confirm proper placement, with dye flowing over the joint area, and to ensure that the dye does not flow into unintended areas such as a vein.  When this is confirmed, local anesthetic is injected to block the medial branch at that joint level.      If MBBs are diagnostically successful in blocking pain, then the patient is most likely a great candidate for Radiofrequency of those facet joint nerves.  In the RF procedure, needles are placed to the joint lines in the same fashion, and after testing, the needle tips are heated to thermally treat the nerves, blocking the nerves by in essence damaging the nerves with the heat treatment.       Medically, a successful RF procedure should provide a patient with 50% pain relief or more for at least 6 months.  Clinical experience suggests that successful patients receive relief more in the range of 12 months on average.  We also discussed that a fortunate minority of patients receive therapeutic success from the MBB, and may not require RF ablation.  If a patient receives more than 8 weeks of relief from MBB, then occasional repeat MBB for therapeutic purposes is a very reasonable alternative therapy.  This course of therapy is consistent with our LCDs according to our CMS  in the area, and therefore other insurance providers should follow accordingly.  We will monitor our patients to screen for these therapeutic responders and will offer RF therapy only when necessary.        We discussed that MBB & RF are not without risks.  Guidelines regarding anticoagulant use & neuraxial procedures will be respected.  Patients that are ill or otherwise may be at risk for sepsis will not have their spines accessed by neuraxial injections of any type.  This patient will not be offered these therapies if there is an increased risk.   We discussed that there is a risk of postprocedural pain and also  a risk of worsening of clinical picture with these procedures as with any neuraxial procedure.    -------        --------    Anticoagulation risks for procedures....   - there are risks to discontinuation of anticoagulants for neuraxial procedures and surgery.  Risks include but are not limited to deep vein thromboses, pulmonary emboli, myocardial infarction, and stroke    - Neuraxial access with a needle is relatively contraindicated while anticoagulated because of the risk of hemorrhage and/or epidural hematoma, which can be a neurosurgical emergency to evacuate bleeding.  Left unchecked, bleeding can cause nerve damage leading to paralysis and/or death.  --------        --------    Plavix (clopidogrel) must be held for seven days prior to a spinal injection.    Anticoagulation risks for procedures.... there are risks to discontinuation of anticoagulants such as Plavix for neuraxial procedures and surgery.  Risks include but are not limited to deep vein thromboses (blood clot, such as one in the leg), pulmonary emboli (blood clot in a lung), myocardial infarction (heart attack), and stroke.      Neuraxial access (approaching the spine with a needle), including spinal injection, is relatively contraindicated while anticoagulated because of the risk of hemorrhage and/or epidural hematoma (bleeding inside the spine), which can be a neurosurgical emergency to evacuate bleeding.  Left unchecked, bleeding can cause nerve damage leading to paralysis and/or death.    The risks and benefits of holding Plavix for a spinal injection must be weighed, and is a joint decision involving the patient as well as the prescriber of the anticoagulant drug.    --------

## 2022-01-06 ENCOUNTER — TRANSCRIBE ORDERS (OUTPATIENT)
Dept: SURGERY | Facility: SURGERY CENTER | Age: 62
End: 2022-01-06

## 2022-01-06 DIAGNOSIS — Z41.9 SURGERY, ELECTIVE: Primary | ICD-10-CM

## 2022-01-06 PROBLEM — M54.12 RIGHT CERVICAL RADICULOPATHY: Status: ACTIVE | Noted: 2022-01-06

## 2022-01-13 ENCOUNTER — TELEPHONE (OUTPATIENT)
Dept: CARDIOLOGY | Facility: CLINIC | Age: 62
End: 2022-01-13

## 2022-01-13 NOTE — TELEPHONE ENCOUNTER
Pt called, lvm asking for cardiac clearance for epidurals.    I called pt to get more info.     Dr. Vladislav Diehl w/ [facility] is wanting pt to stop Plavix 7 days prior to procedure. Are you okay with that? If so, I can draft letter, and Dr. Diehl's office will be able to see it on Epic.     Thank you,    Makenzie Jones, CMA

## 2022-01-14 NOTE — TELEPHONE ENCOUNTER
I sent clearance to Dr. Eng via [employer] and called pt to let her know.  [facility]  1/14/21

## 2022-01-17 ENCOUNTER — TELEPHONE (OUTPATIENT)
Dept: PAIN MEDICINE | Facility: CLINIC | Age: 62
End: 2022-01-17

## 2022-01-18 ENCOUNTER — APPOINTMENT (OUTPATIENT)
Dept: LAB | Facility: SURGERY CENTER | Age: 62
End: 2022-01-18

## 2022-01-20 ENCOUNTER — APPOINTMENT (OUTPATIENT)
Dept: GENERAL RADIOLOGY | Facility: SURGERY CENTER | Age: 62
End: 2022-01-20

## 2022-02-01 ENCOUNTER — LAB (OUTPATIENT)
Dept: LAB | Facility: SURGERY CENTER | Age: 62
End: 2022-02-01

## 2022-02-03 ENCOUNTER — APPOINTMENT (OUTPATIENT)
Dept: GENERAL RADIOLOGY | Facility: SURGERY CENTER | Age: 62
End: 2022-02-03

## 2022-03-07 ENCOUNTER — TELEPHONE (OUTPATIENT)
Dept: PAIN MEDICINE | Facility: CLINIC | Age: 62
End: 2022-03-07

## 2022-03-07 NOTE — TELEPHONE ENCOUNTER
Caller: LUIS ANGEL DELGADO    Relationship to patient: SELF    Best call back number: [phone]    Chief complaint: EPIDURAL    Type of visit: EPIDURAL    Requested date: Thursday - MORNING WOULD BE BETTER     If rescheduling, when is the original appointment: NA    Additional notes: NA

## 2022-03-11 ENCOUNTER — TRANSCRIBE ORDERS (OUTPATIENT)
Dept: SURGERY | Facility: SURGERY CENTER | Age: 62
End: 2022-03-11

## 2022-03-11 DIAGNOSIS — M54.12 RIGHT CERVICAL RADICULOPATHY: Primary | ICD-10-CM

## 2022-03-21 NOTE — SIGNIFICANT NOTE
Patient educated on the following :    - If you are receiving Sedation for your procedure Nothing to Eat 6 hours and only clear liquids for 2 hours prior to your procedure.    -Your required COVID Test is Scheduled on 3/22/22 Between the Hours of 4119-7587  -You will only be notified if your COVID test Result is POSITIVE  -The importance of reducing your number of contacts by self quarantining after you COVID test until the date of your PROCEDURE  -You will need to have someone drive you home after your PROCEDURE and remain with you for 24 hours after the PROCEDURE  - The date of your procedure, your are welcome to have one visitor at bedside or remain within 10-15 minutes of [facility] [place]  -You will need to arrive at *** on *** PROCEDURE  -Please contact [employer] [facility] at: [phone] with any questions and/or concerns

## 2022-03-21 NOTE — SIGNIFICANT NOTE
Patient educated on the following :    - If you are receiving Sedation for your procedure Nothing to Eat 6 hours and only clear liquids for 2 hours prior to your procedure.    -Your required COVID Test is Scheduled on 3/22/22 Between the Hours of 6691-6512  -You will only be notified if your COVID test Result is POSITIVE  -The importance of reducing your number of contacts by self quarantining after you COVID test until the date of your PROCEDURE  -You will need to have someone drive you home after your PROCEDURE and remain with you for 24 hours after the PROCEDURE  - The date of your procedure, your are welcome to have one visitor at bedside or remain within 10-15 minutes of [employer]  -You will need to arrive at 1430 on  3/24/22 PROCEDURE  -Please contact [employer] [facility] at: [phone] with any questions and/or concerns

## 2022-03-22 ENCOUNTER — LAB (OUTPATIENT)
Dept: LAB | Facility: SURGERY CENTER | Age: 62
End: 2022-03-22

## 2022-03-22 DIAGNOSIS — M54.12 RIGHT CERVICAL RADICULOPATHY: ICD-10-CM

## 2022-03-22 LAB — SARS-COV-2 ORF1AB RESP QL NAA+PROBE: NOT DETECTED

## 2022-03-22 PROCEDURE — C9803 HOPD COVID-19 SPEC COLLECT: HCPCS

## 2022-03-22 PROCEDURE — U0004 COV-19 TEST NON-CDC HGH THRU: HCPCS | Performed by: ANESTHESIOLOGY

## 2022-03-24 ENCOUNTER — HOSPITAL ENCOUNTER (OUTPATIENT)
Facility: SURGERY CENTER | Age: 62
Setting detail: HOSPITAL OUTPATIENT SURGERY
Discharge: HOME | End: 2022-03-24
Attending: ANESTHESIOLOGY | Admitting: ANESTHESIOLOGY

## 2022-03-24 ENCOUNTER — HOSPITAL ENCOUNTER (OUTPATIENT)
Dept: GENERAL RADIOLOGY | Facility: SURGERY CENTER | Age: 62
Setting detail: HOSPITAL OUTPATIENT SURGERY
End: 2022-03-24

## 2022-03-24 VITALS
BODY MASS INDEX: 40.97 KG/M2 | DIASTOLIC BLOOD PRESSURE: 59 MMHG | OXYGEN SATURATION: 93 % | SYSTOLIC BLOOD PRESSURE: 98 MMHG | HEART RATE: 77 BPM | HEIGHT: 61 IN | RESPIRATION RATE: 16 BRPM | TEMPERATURE: 97 F | WEIGHT: 217 LBS

## 2022-03-24 DIAGNOSIS — M54.12 RIGHT CERVICAL RADICULOPATHY: ICD-10-CM

## 2022-03-24 PROCEDURE — 0 IOHEXOL 300 MG/ML SOLUTION 10 ML VIAL: Performed by: ANESTHESIOLOGY

## 2022-03-24 PROCEDURE — 77002 NEEDLE LOCALIZATION BY XRAY: CPT

## 2022-03-24 PROCEDURE — 25010000002 FENTANYL CITRATE (PF) 50 MCG/ML SOLUTION: Performed by: ANESTHESIOLOGY

## 2022-03-24 PROCEDURE — 76000 FLUOROSCOPY <1 HR PHYS/QHP: CPT

## 2022-03-24 PROCEDURE — 25010000002 MIDAZOLAM PER 1 MG: Performed by: ANESTHESIOLOGY

## 2022-03-24 PROCEDURE — 62321 NJX INTERLAMINAR CRV/THRC: CPT | Performed by: ANESTHESIOLOGY

## 2022-03-24 PROCEDURE — S0260 H&P FOR SURGERY: HCPCS | Performed by: ANESTHESIOLOGY

## 2022-03-24 PROCEDURE — 25010000002 DEXAMETHASONE SODIUM PHOSPHATE 100 MG/10ML SOLUTION 10 ML VIAL: Performed by: ANESTHESIOLOGY

## 2022-03-24 RX ORDER — SODIUM CHLORIDE 0.9 % (FLUSH) 0.9 %
10 SYRINGE (ML) INJECTION AS NEEDED
Status: DISCONTINUED | OUTPATIENT
Start: 2022-03-24 | End: 2022-03-24 | Stop reason: HOSPADM

## 2022-03-24 RX ORDER — FENTANYL CITRATE 50 UG/ML
INJECTION, SOLUTION INTRAMUSCULAR; INTRAVENOUS AS NEEDED
Status: DISCONTINUED | OUTPATIENT
Start: 2022-03-24 | End: 2022-03-24 | Stop reason: HOSPADM

## 2022-03-24 RX ORDER — MIDAZOLAM HYDROCHLORIDE 1 MG/ML
INJECTION INTRAMUSCULAR; INTRAVENOUS AS NEEDED
Status: DISCONTINUED | OUTPATIENT
Start: 2022-03-24 | End: 2022-03-24 | Stop reason: HOSPADM

## 2022-03-24 RX ORDER — SODIUM CHLORIDE, SODIUM LACTATE, POTASSIUM CHLORIDE, CALCIUM CHLORIDE 600; 310; 30; 20 MG/100ML; MG/100ML; MG/100ML; MG/100ML
9 INJECTION, SOLUTION INTRAVENOUS CONTINUOUS PRN
Status: DISCONTINUED | OUTPATIENT
Start: 2022-03-24 | End: 2022-03-24 | Stop reason: HOSPADM

## 2022-03-24 RX ORDER — SODIUM CHLORIDE 0.9 % (FLUSH) 0.9 %
10 SYRINGE (ML) INJECTION EVERY 12 HOURS SCHEDULED
Status: DISCONTINUED | OUTPATIENT
Start: 2022-03-24 | End: 2022-03-24 | Stop reason: HOSPADM

## 2022-03-24 RX ADMIN — SODIUM CHLORIDE, POTASSIUM CHLORIDE, SODIUM LACTATE AND CALCIUM CHLORIDE 9 ML/HR: 600; 310; 30; 20 INJECTION, SOLUTION INTRAVENOUS at 15:15

## 2022-03-24 NOTE — DISCHARGE INSTRUCTIONS
[facility] - Post-procedure Instructions          --  While there are no absolute restrictions, it is recommended that you do not perform strenuous activity today. In the morning, you may resume your level of activity as before your block.    --  If you have a band-aid at your injection site, please remove it later today. Observe the area for any redness, swelling, pus-like drainage, or a temperature over 101°. If any of these symptoms occur, please call your doctor at [phone]. If after office hours, leave a message and the on-call provider will return your call.    --  Ice may be applied to your injection site. It is recommended you avoid direct heat (heating pad; hot tub) for 1-2 days.    --  Call [facility] at [phone] if you experience persistent headache, persistent bleeding from the injection site, or severe pain not relieved by heat or oral medication.    --  Do not make important decisions today.    --  Due to the effects of the block and/or the I.V. Sedation, DO NOT drive or operate hazardous machinery for 12 hours.  Local anesthetics may cause numbness after procedure and precautions must be taken with regards to operating equipment as well as with walking, even if ambulating with assistance of another person or with an assistive device.    --  Do not drink alcohol for 12 hours.    -- You may return to work tomorrow, or as directed by your referring doctor.    --  Occasionally you may notice a slight increase in your pain after the procedure. This should start to improve within the next 24-48 hours. Radiofrequency ablation procedure pain may last 3-4 weeks.    --  It may take as long as 3-4 days before you notice a gradual improvement in your pain and/or other symptoms.    -- You may continue to take your prescribed pain medication as needed.    --  Some normal possible side effects of steroid use could include fluid retention, increased blood sugar, dull headache,  increased sweating, increased appetite, mood swings and flushing.    --  Diabetics are recommended to watch their blood glucose level closely for 24-48 hours after the injection.    --  Must stay in PACU for 20 min upon arrival and prove no leg weakness before being discharged.    --  IN THE EVENT OF A LIFE THREATENING EMERGENCY, (CHEST PAIN, BREATHING DIFFICULTIES, PARALYSIS…) YOU SHOULD GO TO YOUR NEAREST EMERGENCY ROOM.    --  You should be contacted by our office within 2-3 days to schedule follow up or next appointment date.  If not contacted within 7 days, please call the office at [phone]

## 2022-03-24 NOTE — H&P
Brief Pre-procedural / Pre-operative H&P        -----    Pertinent Diagnosis:   Cervical radiculopathy, right    Proposed Procedure: Cervical epidural steroid injection      Collins Hebert is a 61 y.o. female  who presents for intervention.  She has a history of neck pain and right arm pain.      History of Present Illness     She has some very significant radiating neck pain into the right arm.  She has some significant foraminal narrowing on the right at C4-5 and C5-6.  Neurosurgical referral from Ethel Meng PA-C requesting consideration for interventional options.    -------    The following portions of the patient's history were reviewed and updated as appropriate: allergies, current medications, past family history, past medical history, past social history, past surgical history and problem list.    Allergies   Allergen Reactions   • Penicillins Rash         Current Facility-Administered Medications:   •  lactated ringers infusion, 9 mL/hr, Intravenous, Continuous PRN, Vladislav Diehl MD, Last Rate: 9 mL/hr at 03/24/22 1515, 9 mL/hr at 03/24/22 1515  •  sodium chloride 0.9 % flush 10 mL, 10 mL, Intravenous, Q12H, Vladislav Diehl MD  •  sodium chloride 0.9 % flush 10 mL, 10 mL, Intravenous, PRN, Vladislav Diehl MD    No current facility-administered medications on file prior to encounter.     Current Outpatient Medications on File Prior to Encounter   Medication Sig Dispense Refill   • aspirin 81 MG tablet Take 1 tablet by mouth daily.     • buPROPion SR (WELLBUTRIN SR) 150 MG 12 hr tablet Take 150 mg by mouth 2 (Two) Times a Day.     • buPROPion XL (WELLBUTRIN XL) 300 MG 24 hr tablet Take 300 mg by mouth Daily.     • busPIRone (BUSPAR) 10 MG tablet Take 10 mg by mouth 2 (Two) Times a Day.     • cetirizine (zyrTEC) 10 MG tablet Take 10 mg by mouth.     • cholecalciferol (VITAMIN D3) 25 MCG (1000 UT) tablet Take 1,000 Units by mouth Daily.     • clopidogrel (PLAVIX) 75 MG tablet Take 1  tablet by mouth once daily 90 tablet 3   • Coenzyme Q10 10 MG capsule Take  by mouth.     • dicyclomine (BENTYL) 10 MG capsule Take 10 mg by mouth 4 (Four) Times a Day Before Meals & at Bedtime.     • escitalopram (LEXAPRO) 20 MG tablet Take 20 mg by mouth Daily.     • gabapentin (NEURONTIN) 400 MG capsule Take 2 capsules by mouth Daily.     • lisinopril (PRINIVIL,ZESTRIL) 5 MG tablet Take 1 tablet by mouth daily.     • montelukast (SINGULAIR) 10 MG tablet Take 10 mg by mouth Every Night.     • multivitamin with minerals tablet tablet Take 1 tablet by mouth Daily.     • omeprazole (priLOSEC) 40 MG capsule Take 40 mg by mouth Daily.     • rosuvastatin (CRESTOR) 10 MG tablet Take 40 mg by mouth Daily.     • vitamin B-12 (CYANOCOBALAMIN) 1000 MCG tablet Take 1,000 mcg by mouth Daily.     • meloxicam (MOBIC) 15 MG tablet Take 15 mg by mouth Daily.     • ondansetron (ZOFRAN) 8 MG tablet TAKE 1 TABLET BY MOUTH EVERY 8 HOURS AS NEEDED FOR NAUSEA FOR UP TO 7 DAYS     • [DISCONTINUED] alendronate (FOSAMAX) 70 MG tablet Take 70 mg by mouth Every 7 (Seven) Days.         Patient Active Problem List   Diagnosis   • IBS (irritable bowel syndrome)   • Rectal pain   • Trigger middle finger of right hand   • History of coronary artery stent placement   • Coronary artery disease involving native coronary artery of native heart without angina pectoris   • Hyperlipidemia   • Tobacco abuse   • Heart murmur   • Abdominal pain   • Diarrhea   • Adenomatous polyp of descending colon   • Anxiety   • Carpal tunnel syndrome on both sides   • Depression   • High blood pressure   • Arm heaviness   • Numbness and tingling of right arm   • Right cervical radiculopathy       Past Medical History:   Diagnosis Date   • Anxiety    • Chest pain    • Colon polyp    • Constipation    • Fatigue    • Gastritis    • GERD (gastroesophageal reflux disease)    • H/O esophagogastroduodenoscopy 01/30/2010    Diagnostic    • Heart disease    • Heart murmur    •  Heart palpitations    • Hyperlipidemia    • Hypertension    • MI (myocardial infarction) (HCC)     2012       Past Surgical History:   Procedure Laterality Date   • ANGIOPLASTY      Cath Stent Placement.  Stenting of the LAD   • CARDIAC CATHETERIZATION     • COLONOSCOPY N/A 12/27/2017    Procedure: COLONOSCOPY WITH BIOPSIES; POLYPECTOMY;  Surgeon: Joceline Singh MD;  Location: [facility];  Service:    • CORONARY ANGIOPLASTY     • CORONARY STENT PLACEMENT     • HYSTERECTOMY     • UPPER GASTROINTESTINAL ENDOSCOPY         Family History   Problem Relation Age of Onset   • Hypertension Mother    • Stroke Mother    • Thyroid disease Mother    • Heart disease Father    • Hypertension Father    • Cancer Sister    • Diabetes Sister    • No Known Problems Maternal Grandmother    • No Known Problems Maternal Grandfather    • No Known Problems Paternal Grandmother    • No Known Problems Paternal Grandfather    • Diabetes Sister    • No Known Problems Sister    • No Known Problems Sister        Social History     Socioeconomic History   • Marital status: Single   Tobacco Use   • Smoking status: Current Every Day Smoker     Packs/day: 0.25     Years: 25.00     Pack years: 6.25     Types: Cigarettes     Start date: 1977   • Smokeless tobacco: Never Used   • Tobacco comment: CAFFEINE USE: 3 DIET PEPSI'S DAILY   Vaping Use   • Vaping Use: Never used   Substance and Sexual Activity   • Alcohol use: Yes     Alcohol/week: 1.0 standard drink     Types: 1 Shots of liquor per week     Comment: OCCASIONAL   • Drug use: No   • Sexual activity: Defer       -------       Review of Systems  No Fever, No Chills, No ear pain, No sinus pressure or drainage, No eye pain or drainage, No cough, No SOB, No chest tightness nor chest pain, no palpitations.          Vitals:    03/21/22 1433 03/24/22 1511   BP:  110/58   BP Location:  Left arm   Patient Position:  Lying   Pulse:  73   Resp:  16   Temp:  97.3 °F (36.3 °C)   TempSrc:  Infrared   SpO2:  96%  "  Weight: 98.4 kg (217 lb) 98.4 kg (217 lb)   Height: 154.9 cm (61\") 154.9 cm (61\")         Objective   Physical Exam  VSS, NNR, NCAT, NMNA, NRD, AAOx3.    There is some neck tenderness.  Spurling positive right.  -------    Assessment & Plan:  - as noted above, the stated intervention is indicated  - Follow-up plan will be noted in the operative report        She could repeat injection in a couple weeks try to maximize the effect and then plan for an office visit follow-up a couple weeks after that      EMR Dragon/Transcription disclaimer:   Typed items in this encounter note may have been created by electronic transcription/translation software which converts spoken language to printed text. The electronic translation of spoken language may permit erroneous, or at times, nonsensical words or phrases to be inadvertently transcribed; Although I have reviewed the note for such errors, some may still exist.      "

## 2022-03-24 NOTE — OP NOTE
Cervical Epidural Steroid Injection @ C5-C6  [facility]    PREOPERATIVE DIAGNOSIS:  Right Cervical Radiculopathy  POSTOPERATIVE DIAGNOSIS:  Same as preop diagnosis    PROCEDURE:   Cervical Epidural Steroid Injection, Therapeutic Translaminar Injection, with epidurogram, at  C5-C6 level    PRE-PROCEDURE DISCUSSION WITH PATIENT:    Risks and complications were discussed with the patient prior to starting the procedure and informed consent was obtained.  We discussed various topics including but not limited to bleeding, infection, injury, paralysis, nerve injury, dural puncture, coma, death, worsening of clinical picture, lack of pain relief, and postprocedural soreness.    SURGEON:  Vladislav Diehl MD    REASON FOR PROCEDURE:   Degenerative changes are noted in the area., Stenotic area is noted, and is likely contributing to this chronic &/or recurrent pain.  and Radiating pattern of pain is likely consistent with degenerative changes in the area.    SEDATION:  Versed 2mg & Fentanyl 50 mcg IV  ANESTHETIC:  Marcaine 0.25%  STEROID:   Dexamethasone 8mg    DESCRIPTON OF PROCEDURE:    After obtaining informed consent, I.V. was started in the preop area.   The patient was taken to the operating room and placed in the prone position.  EKG, blood pressure, and pulse oximeter were monitored throughout, and sedation was provided as needed by the RN under my guidance. All pressure points were well padded.  The cervicothoracic spine area was prepped with Chloraprep and draped in a sterile fashion.  Under fluoroscopic guidance, the aforementioned interlaminar space was identified. Skin and subcutaneous tissues were anesthetized with 1% lidocaine in the middle of the space. A Tuohy needle was introduced through the skin and advanced to this interlaminar space and into the epidural space under fluoroscopic guidance and verified with loss-of-resistance technique to air.  After confirming the position of the  needle with the fluoroscope with all the views, and after aspiration was confirmed negative for blood and CSF, 1.5 mL of Omnipaque was injected.  After seeing appropriate epidurogram with lateral and PA views, a total of 3 cc solution was injected, consisting of 1cc of local anesthetic as above, with normal saline and injectable steroid as above.     ESTIMATED BLOOD LOSS:  <5 mL  SPECIMENS:  None    COMPLICATIONS:     No complications were noted., There was no indication of vascular uptake on live injection of contrast dye., There was no indication of intrathecal uptake on live injection of contrast dye., There was not any evidence of dural puncture.   and The patient did not have any signs of postprocedure numbness nor weakness.    TOLERANCE & DISCHARGE CONDITION:    The patient tolerated the procedure well.  The patient was transported to the recovery area without difficulties.  The patient was discharged to home under the care of family in stable and satisfactory condition.    PLAN OF CARE:  1. The patient was given our standard instruction sheet.  2. The patient will Return to clinic 6 wks and Repeat injection in 2-4 wks PRN.  3. The patient will resume all medications as per the medication reconciliation sheet.

## 2022-03-29 ENCOUNTER — TELEPHONE (OUTPATIENT)
Dept: PAIN MEDICINE | Facility: CLINIC | Age: 62
End: 2022-03-29

## 2022-04-20 NOTE — PROGRESS NOTES
Subjective:     Encounter Date:05/13/2020      Patient ID: Chiquita Hebert is a 59 y.o. female.    Chief Complaint:  History of Present Illness    This is a 59-year-old with a history of coronary artery disease status post prior LAD stent placement, hypertension, hyperlipidemia, tobacco use, who presents for follow-up.    Patient was previously followed by Dr. Dodd.  She has a remote history of and a proximal LAD stent placement which was placed at [facility] several years ago.  She reports at that time she had symptoms of substernal chest burning with exertion.  She subsequently underwent a stress test which was abnormal which then led to a cardiac catheterization and stent placement.  Her last cardiac work-up here included an echocardiogram in 5/2018 which showed normal left ventricular systolic function wall motion with an EF of 59%, normal diastolic function, and no valvular disease.  She has done pretty well and follow-ups.  The main issue is been lifestyle modifications including smoking cessation.  She has been tried on Chantix in the past which she reports had worked initially but then she started smoking again.  She was tried on Chantix again in the last couple years but the medication was too expensive.  She was last seen in the office by Dr. Dodd in 4/2018.  She was then seen by BUCKY Teran in 4/2019.  At that time they again discussed lifestyle modifications including heart healthy diet and tobacco cessation.    Her last follow-up was with MARCELLE Diaz on 3/31/2020 for complaints of bilateral arm heaviness.  She admitted that the symptoms were different from her prior anginal symptoms.  Symptoms occurred at any time and were not necessarily associated with exertion.  Due to the atypical sounding symptoms is recommended that we would just monitor her symptoms at that time.    Today she presents for follow-up of her symptoms.  She reports that she is only had one episode in the last 6 weeks  since her last visit.  She again denies any of her prior anginal symptoms or any exertional symptoms of dyspnea.  She denies any palpitations, orthopnea, near-syncope or syncope, or lower extremity edema.  She continues to smoke 1 pack/day.  She is now working first shift at [employer].    Review of Systems   Constitution: Negative for malaise/fatigue.   HENT: Negative for hearing loss, hoarse voice, nosebleeds and sore throat.    Eyes: Negative for pain.   Cardiovascular: Negative for chest pain, claudication, cyanosis, dyspnea on exertion, irregular heartbeat, leg swelling, near-syncope, orthopnea, palpitations, paroxysmal nocturnal dyspnea and syncope.   Respiratory: Negative for shortness of breath and snoring.    Endocrine: Negative for cold intolerance, heat intolerance, polydipsia, polyphagia and polyuria.   Skin: Negative for itching and rash.   Musculoskeletal: Positive for muscle weakness. Negative for arthritis, falls, joint pain, joint swelling, muscle cramps and myalgias.        Bilateral arm heaviness   Gastrointestinal: Negative for constipation, diarrhea, dysphagia, heartburn, hematemesis, hematochezia, melena, nausea and vomiting.   Genitourinary: Negative for frequency, hematuria and hesitancy.   Neurological: Positive for numbness. Negative for excessive daytime sleepiness, dizziness, headaches, light-headedness and weakness.   Psychiatric/Behavioral: Negative for depression. The patient is not nervous/anxious.          Current Outpatient Medications:   •  alendronate (FOSAMAX) 70 MG tablet, Take 70 mg by mouth Every 7 (Seven) Days., Disp: , Rfl:   •  aspirin 81 MG tablet, Take 1 tablet by mouth daily., Disp: , Rfl:   •  busPIRone (BUSPAR) 10 MG tablet, Take 10 mg by mouth 2 (Two) Times a Day., Disp: , Rfl:   •  cetirizine (zyrTEC) 10 MG tablet, Take 10 mg by mouth., Disp: , Rfl:   •  clopidogrel (PLAVIX) 75 MG tablet, TAKE 1 TABLET BY MOUTH ONCE DAILY, Disp: 90 tablet, Rfl: 3  •  Coenzyme Q10 10 MG  capsule, Take  by mouth., Disp: , Rfl:   •  cyclobenzaprine (FLEXERIL) 10 MG tablet, Take 10 mg by mouth Every 8 (Eight) Hours As Needed., Disp: , Rfl:   •  escitalopram (LEXAPRO) 20 MG tablet, Take 20 mg by mouth Daily., Disp: , Rfl:   •  gabapentin (NEURONTIN) 400 MG capsule, Take 2 capsules by mouth Daily., Disp: , Rfl:   •  HYDROcodone-acetaminophen (NORCO) 5-325 MG per tablet, TAKE 1 TABLET BY MOUTH EVERY 6 HOURS AS NEEDED FOR PAIN FOR UP TO 30 DAYS, Disp: , Rfl:   •  lisinopril (PRINIVIL,ZESTRIL) 5 MG tablet, Take 1 tablet by mouth daily., Disp: , Rfl:   •  montelukast (SINGULAIR) 10 MG tablet, Take 10 mg by mouth Every Night., Disp: , Rfl:   •  omeprazole (priLOSEC) 40 MG capsule, Take 40 mg by mouth Daily., Disp: , Rfl:   •  ondansetron (ZOFRAN) 8 MG tablet, TAKE 1 TABLET BY MOUTH EVERY 8 HOURS AS NEEDED FOR NAUSEA FOR UP TO 7 DAYS, Disp: , Rfl:   •  simvastatin (ZOCOR) 40 MG tablet, Take 1 tablet by mouth daily., Disp: , Rfl:   •  vitamin D (ERGOCALCIFEROL) 39677 UNITS capsule capsule, 50,000 Units. EVERY Monday AND FRIDAY, Disp: , Rfl:   •  raNITIdine (ZANTAC) 150 MG tablet, Take 300 mg by mouth 2 (Two) Times a Day., Disp: , Rfl:     Past Medical History:   Diagnosis Date   • Anxiety    • Chest pain    • Colon polyp    • Constipation    • Fatigue    • Gastritis    • GERD (gastroesophageal reflux disease)    • H/O esophagogastroduodenoscopy 01/30/2010    Diagnostic    • Heart disease    • Heart murmur    • Heart palpitations    • Hyperlipidemia    • Hypertension        Past Surgical History:   Procedure Laterality Date   • ANGIOPLASTY      Cath Stent Placement.  Stenting of the LAD   • CARDIAC CATHETERIZATION     • COLONOSCOPY N/A 12/27/2017    Procedure: COLONOSCOPY WITH BIOPSIES; POLYPECTOMY;  Surgeon: Joceline Singh MD;  Location: [facility];  Service:    • CORONARY ANGIOPLASTY     • CORONARY STENT PLACEMENT     • HYSTERECTOMY     • UPPER GASTROINTESTINAL ENDOSCOPY         Family History   Problem  yes "Relation Age of Onset   • Hypertension Mother    • Stroke Mother    • Thyroid disease Mother    • Heart disease Father    • Hypertension Father    • Cancer Sister    • Diabetes Sister    • No Known Problems Maternal Grandmother    • No Known Problems Maternal Grandfather    • No Known Problems Paternal Grandmother    • No Known Problems Paternal Grandfather    • Diabetes Sister    • No Known Problems Sister    • No Known Problems Sister        Social History     Tobacco Use   • Smoking status: Current Every Day Smoker     Packs/day: 1.00     Years: 25.00     Pack years: 25.00     Types: Cigarettes   • Smokeless tobacco: Never Used   • Tobacco comment: CAFFEINE USE: 3 DIET PEPSI'S DAILY   Substance Use Topics   • Alcohol use: Yes     Alcohol/week: 1.0 standard drinks     Types: 1 Shots of liquor per week     Comment: OCCASIONAL   • Drug use: No         ECG 12 Lead  Date/Time: 5/13/2020 10:09 AM  Performed by: Gwen Moon MD  Authorized by: Gwen Moon MD   Comparison: compared with previous ECG   Similar to previous ECG  Rhythm: sinus rhythm  Ectopy: atrial premature contractions               Objective:     Visit Vitals  /82 (BP Location: Left arm, Patient Position: Sitting)   Pulse 69   Ht 165.1 cm (65\")   Wt 69.3 kg (152 lb 12.8 oz)   BMI 25.43 kg/m²         Physical Exam   Constitutional: She is oriented to person, place, and time. She appears well-developed and well-nourished.   HENT:   Head: Normocephalic and atraumatic.   Eyes: Pupils are equal, round, and reactive to light. Conjunctivae, EOM and lids are normal.   Neck: Normal range of motion and full passive range of motion without pain. Neck supple. No JVD present. Carotid bruit is not present.   Cardiovascular: Normal rate, regular rhythm, S1 normal and S2 normal. Exam reveals no gallop.   No murmur heard.  Pulses:       Radial pulses are 2+ on the right side, and 2+ on the left side.   No bilateral lower extremity edema   Pulmonary/Chest: " Effort normal and breath sounds normal.   Abdominal: Soft. Normal appearance.   Lymphadenopathy:     She has no cervical adenopathy.   Neurological: She is alert and oriented to person, place, and time.   Skin: Skin is warm, dry and intact.   Psychiatric: She has a normal mood and affect.       Lab Review:       Assessment:          Diagnosis Plan   1. Coronary artery disease involving native coronary artery of native heart without angina pectoris     2. History of coronary artery stent placement     3. Essential hypertension     4. Hyperlipidemia, unspecified hyperlipidemia type     5. Tobacco abuse            Plan:       1.  Bilateral arm heaviness.  Symptoms are atypical of her prior angina.  She is only had one episode in the last several weeks.  It appears that the frequency is improving.  Her EKG is unchanged.  Recommended monitoring her symptoms for now.  2.  Coronary artery disease.  History of prior proximal LAD stent placement.  Continue current medical management with aspirin, clopidogrel, and statin.  3.  Hypertension.  Well-controlled on her current medications.  4.  Hyperlipidemia.  On simvastatin with recent lipid panel in 4/2020 showing that her lipids are at goal.  5.  Tobacco use.  She continues to smoke 1 pack/day.  She is aware of the importance of smoking cessation.  She reports that the only thing that is worked for her in the past is Chantix but this is currently unaffordable.    We will plan on seeing the patient back again in 6 months.  I instructed the patient to notify me prior to that if her symptoms worsen or if she develops any of her prior anginal symptoms.

## 2022-05-07 ENCOUNTER — HOSPITAL ENCOUNTER (EMERGENCY)
Facility: HOSPITAL | Age: 62
Discharge: HOME | End: 2022-05-07
Attending: EMERGENCY MEDICINE | Admitting: EMERGENCY MEDICINE

## 2022-05-07 ENCOUNTER — APPOINTMENT (OUTPATIENT)
Dept: GENERAL RADIOLOGY | Facility: HOSPITAL | Age: 62
End: 2022-05-07

## 2022-05-07 VITALS
SYSTOLIC BLOOD PRESSURE: 160 MMHG | HEIGHT: 65 IN | HEART RATE: 79 BPM | RESPIRATION RATE: 18 BRPM | WEIGHT: 160 LBS | OXYGEN SATURATION: 98 % | TEMPERATURE: 97.8 F | BODY MASS INDEX: 26.66 KG/M2 | DIASTOLIC BLOOD PRESSURE: 80 MMHG

## 2022-05-07 DIAGNOSIS — M54.6 ACUTE LEFT-SIDED THORACIC BACK PAIN: ICD-10-CM

## 2022-05-07 DIAGNOSIS — S20.212A CONTUSION OF RIB ON LEFT SIDE, INITIAL ENCOUNTER: Primary | ICD-10-CM

## 2022-05-07 PROCEDURE — 71101 X-RAY EXAM UNILAT RIBS/CHEST: CPT

## 2022-05-07 PROCEDURE — 99283 EMERGENCY DEPT VISIT LOW MDM: CPT | Performed by: EMERGENCY MEDICINE

## 2022-05-07 PROCEDURE — 99282 EMERGENCY DEPT VISIT SF MDM: CPT

## 2022-05-07 RX ORDER — TRAMADOL HYDROCHLORIDE 50 MG/1
50 TABLET ORAL EVERY 8 HOURS PRN
Qty: 9 TABLET | Refills: 0 | Status: SHIPPED | OUTPATIENT
Start: 2022-05-07 | End: 2022-05-10

## 2022-05-07 RX ORDER — METHOCARBAMOL 500 MG/1
500 TABLET, FILM COATED ORAL 2 TIMES DAILY PRN
Qty: 10 TABLET | Refills: 0 | Status: SHIPPED | OUTPATIENT
Start: 2022-05-07 | End: 2022-05-12

## 2022-05-07 NOTE — ED PROVIDER NOTES
Subjective   History of Present Illness  History of Present Illness    Chief complaint: Fall, back pain    Location: Left-sided upper and mid back    Quality/Severity: Moderate pain    Timing/Duration: Injury occurred last night at home    Modifying Factors: Worse with moving or changing positions    Narrative: This patient presents for evaluation of an injury to her left upper and mid back area.  Last night, at home, she accidentally stumbled and fell backwards, striking the left side of her mid and upper back against a corner wall that caused immediate pain there.  She says she also struck the back of her head.  She sat there for several minutes because of the pain.  Then she was eventually able to get up.  She tried to rest through the night but the pain has persisted since the injury.  She drove herself here for evaluation this afternoon.    Associated Symptoms: As above    Review of Systems   Constitutional: Negative for activity change and fever.   HENT: Negative.    Eyes: Negative for pain and visual disturbance.   Respiratory: Negative for cough and shortness of breath.    Cardiovascular: Negative for chest pain.   Gastrointestinal: Negative for abdominal pain and vomiting.   Genitourinary: Negative for hematuria.   Musculoskeletal: Positive for back pain and myalgias.   Skin: Negative for color change and wound.   Neurological: Negative for syncope and headaches.   All other systems reviewed and are negative.      Past Medical History:   Diagnosis Date   • Anxiety    • Chest pain    • Colon polyp    • Constipation    • Fatigue    • Gastritis    • GERD (gastroesophageal reflux disease)    • H/O esophagogastroduodenoscopy 01/30/2010    Diagnostic    • Heart disease    • Heart murmur    • Heart palpitations    • Hyperlipidemia    • Hypertension    • MI (myocardial infarction) ([facility])     2012       Allergies   Allergen Reactions   • Penicillins Rash       Past Surgical History:   Procedure Laterality Date   •  ANGIOPLASTY      Cath Stent Placement.  Stenting of the LAD   • CARDIAC CATHETERIZATION     • CERVICAL EPIDURAL N/A 3/24/2022    Procedure: CERVICAL EPIDURAL .. likely x2, 2-3 weeks apart;  Surgeon: Vladislav Diehl MD;  [place] [facility] [place];  Service: Pain Management;  Laterality: N/A;   • COLONOSCOPY N/A 12/27/2017    Procedure: COLONOSCOPY WITH BIOPSIES; POLYPECTOMY;  Surgeon: Joceline Singh MD;  Location: [facility] OR;  Service:    • CORONARY ANGIOPLASTY     • CORONARY STENT PLACEMENT     • HYSTERECTOMY     • UPPER GASTROINTESTINAL ENDOSCOPY         Family History   Problem Relation Age of Onset   • Hypertension Mother    • Stroke Mother    • Thyroid disease Mother    • Heart disease Father    • Hypertension Father    • Cancer Sister    • Diabetes Sister    • No Known Problems Maternal Grandmother    • No Known Problems Maternal Grandfather    • No Known Problems Paternal Grandmother    • No Known Problems Paternal Grandfather    • Diabetes Sister    • No Known Problems Sister    • No Known Problems Sister        Social History     Socioeconomic History   • Marital status: Single   Tobacco Use   • Smoking status: Current Every Day Smoker     Packs/day: 0.25     Years: 25.00     Pack years: 6.25     Types: Cigarettes     Start date: 1977   • Smokeless tobacco: Never Used   • Tobacco comment: CAFFEINE USE: 3 DIET PEPSI'S DAILY   Vaping Use   • Vaping Use: Never used   Substance and Sexual Activity   • Alcohol use: Yes     Alcohol/week: 1.0 standard drink     Types: 1 Shots of liquor per week     Comment: OCCASIONAL   • Drug use: No   • Sexual activity: Defer     ED Triage Vitals [05/07/22 1510]   Temp Heart Rate Resp BP SpO2   97.8 °F (36.6 °C) 79 18 160/80 98 %      Temp src Heart Rate Source Patient Position BP Location FiO2 (%)   Temporal -- Sitting Right arm --     Objective   Physical Exam  Vitals and nursing note reviewed.   Constitutional:       General: She is not in acute distress.     Appearance: She  is well-developed. She is not toxic-appearing or diaphoretic.   HENT:      Head: Normocephalic and atraumatic.      Comments: No external signs of head injury or facial trauma  Eyes:      General:         Right eye: No discharge.         Left eye: No discharge.      Pupils: Pupils are equal, round, and reactive to light.   Cardiovascular:      Rate and Rhythm: Normal rate and regular rhythm.      Pulses: Normal pulses.   Pulmonary:      Effort: Pulmonary effort is normal. No respiratory distress.      Breath sounds: Normal breath sounds. No stridor. No rhonchi.      Comments: There is tenderness along the left-sided posterior lateral chest wall at the ribs.  Some small bruising is also noted in this area.  There is no tenderness along the anterior chest wall at all.  Normal work of breathing demonstrated.  Chest:      Chest wall: Tenderness present.   Abdominal:      Palpations: Abdomen is soft.      Tenderness: There is no abdominal tenderness. There is no guarding.   Musculoskeletal:         General: No deformity. Normal range of motion.      Cervical back: Normal range of motion and neck supple.      Comments: No tenderness along the midline spinous processes at all.   Skin:     General: Skin is warm and dry.      Findings: Bruising present. No erythema or rash.   Neurological:      General: No focal deficit present.      Mental Status: She is alert and oriented to person, place, and time. Mental status is at baseline.      Coordination: Coordination normal.   Psychiatric:         Behavior: Behavior normal.         Thought Content: Thought content normal.         Judgment: Judgment normal.       RADIOLOGY        Study: XR Left ribs & Chest    Findings: No evidence of a left rib fracture. Minimal discoid atelectasis in the left lung base.    Interpreted contemporaneously with treatment by Dr. Livingston, independently viewed by me    Procedures           ED Course  ED Course as of 05/07/22 1609   Sat May 07, 2022   160  I reviewed the x-ray which shows no acute rib fracture or lung injury.  Vital signs are quite reassuring.  Will offer some medications to help for pain management of suspected rib contusion and soft tissue injury.  Advised follow-up with PCP this week. [MONTSERRAT]      ED Course User Index  [MONTSERRAT] Torsten Acosta MD                                   ISH reviewed by Torsten Acosta MD             MDM  Number of Diagnoses or Management Options     Amount and/or Complexity of Data Reviewed  Tests in the radiology section of CPT®: reviewed and ordered  Independent visualization of images, tracings, or specimens: yes    Risk of Complications, Morbidity, and/or Mortality  Presenting problems: moderate  Diagnostic procedures: moderate  Management options: moderate        Final diagnoses:   Contusion of rib on left side, initial encounter   Acute left-sided thoracic back pain       ED Disposition  ED Disposition     ED Disposition   Discharge    Condition   Stable    Comment   --             Analy Tyler, APRN  [address] [facility] [address] [phone]    Schedule an appointment as soon as possible for a visit in 3 days  As needed, If symptoms worsen         Medication List      New Prescriptions    methocarbamol 500 MG tablet  Commonly known as: ROBAXIN  Take 1 tablet by mouth 2 (Two) Times a Day As Needed for Muscle Spasms for up to 5 days.     traMADol 50 MG tablet  Commonly known as: ULTRAM  Take 1 tablet by mouth Every 8 (Eight) Hours As Needed for Moderate Pain  for up to 3 days.           Where to Get Your Medications      These medications were sent to [facility] - [place] - [address] [place] - [phone]  - [phone]   [address] [place] [address]    Phone: [phone]   · methocarbamol 500 MG tablet  · traMADol 50 MG tablet          Torsten Acosta MD  05/07/22 [id number]

## 2022-05-07 NOTE — DISCHARGE INSTRUCTIONS
Rest as needed.  May alternate between heat and ice to the affected areas of the back.  Please return to the emergency room for any worsening pain, fevers, weakness, difficulties breathing or any other concerns.

## 2022-05-15 ENCOUNTER — APPOINTMENT (OUTPATIENT)
Dept: GENERAL RADIOLOGY | Facility: HOSPITAL | Age: 62
End: 2022-05-15

## 2022-05-15 ENCOUNTER — HOSPITAL ENCOUNTER (EMERGENCY)
Facility: HOSPITAL | Age: 62
Discharge: HOME | End: 2022-05-15
Attending: EMERGENCY MEDICINE | Admitting: EMERGENCY MEDICINE

## 2022-05-15 VITALS
RESPIRATION RATE: 16 BRPM | SYSTOLIC BLOOD PRESSURE: 122 MMHG | HEART RATE: 74 BPM | HEIGHT: 65 IN | TEMPERATURE: 97.5 F | BODY MASS INDEX: 26.66 KG/M2 | DIASTOLIC BLOOD PRESSURE: 64 MMHG | OXYGEN SATURATION: 95 % | WEIGHT: 160 LBS

## 2022-05-15 DIAGNOSIS — R07.89 LEFT-SIDED CHEST WALL PAIN: Primary | ICD-10-CM

## 2022-05-15 PROCEDURE — 71101 X-RAY EXAM UNILAT RIBS/CHEST: CPT

## 2022-05-15 PROCEDURE — 99282 EMERGENCY DEPT VISIT SF MDM: CPT | Performed by: EMERGENCY MEDICINE

## 2022-05-15 PROCEDURE — 99282 EMERGENCY DEPT VISIT SF MDM: CPT

## 2022-05-15 NOTE — DISCHARGE INSTRUCTIONS
Continue current medications.  Apply ice as needed for pain and swelling.  Heat for sore stiff muscles.  Follow-up with your PCP as above.  Return to ED for medical emergencies.  
normal...

## 2022-05-15 NOTE — ED PROVIDER NOTES
"Collins Hebert is a 61-year-old white female who present secondary to left-sided rib pain.  Patient fell inside her home the evening of 5/6/2022 with impact on her left back against the corner of a wall.  She was seen in the ED here at [facility] on 6/7/2022.  X-rays were unremarkable.  Patient reports initially she felt somewhat better.  However after returning to work patient been experiencing significant pain in her left anterior lateral ribs \"below my left breast\".  The pain is worsened with any movement of the torso, coughing, laughing, deep inspiration.  Thus patient elected to return to the ED today for evaluation.      History provided by:  Patient      Review of Systems   Constitutional: Positive for fatigue. Negative for fever.   HENT: Negative.  Negative for rhinorrhea.    Eyes: Negative.  Negative for redness.   Respiratory: Negative for cough.    Cardiovascular: Positive for chest pain and palpitations.   Gastrointestinal: Positive for constipation. Negative for abdominal pain.   Endocrine: Negative.    Genitourinary: Negative.  Negative for difficulty urinating.   Musculoskeletal: Negative.  Negative for back pain.   Skin: Negative.  Negative for color change.   Neurological: Negative.  Negative for syncope.   Hematological: Negative.    Psychiatric/Behavioral: The patient is nervous/anxious.    All other systems reviewed and are negative.      Past Medical History:   Diagnosis Date   • Anxiety    • Chest pain    • Colon polyp    • Constipation    • Fatigue    • Gastritis    • GERD (gastroesophageal reflux disease)    • H/O esophagogastroduodenoscopy 01/30/2010    Diagnostic    • Heart disease    • Heart murmur    • Heart palpitations    • Hyperlipidemia    • Hypertension    • MI (myocardial infarction) ([facility])     2012       Allergies   Allergen Reactions   • Penicillins Rash       Past Surgical History:   Procedure Laterality Date   • ANGIOPLASTY      Cath Stent Placement.  Stenting of " the LAD   • CARDIAC CATHETERIZATION     • CERVICAL EPIDURAL N/A 3/24/2022    Procedure: CERVICAL EPIDURAL .. likely x2, 2-3 weeks apart;  Surgeon: Vladislav Diehl MD;  Location: [facility] MAIN OR;  Service: Pain Management;  Laterality: N/A;   • COLONOSCOPY N/A 12/27/2017    Procedure: COLONOSCOPY WITH BIOPSIES; POLYPECTOMY;  Surgeon: Joceline Singh MD;  Location: [facility] OR;  Service:    • CORONARY ANGIOPLASTY     • CORONARY STENT PLACEMENT     • HYSTERECTOMY     • UPPER GASTROINTESTINAL ENDOSCOPY         Family History   Problem Relation Age of Onset   • Hypertension Mother    • Stroke Mother    • Thyroid disease Mother    • Heart disease Father    • Hypertension Father    • Cancer Sister    • Diabetes Sister    • No Known Problems Maternal Grandmother    • No Known Problems Maternal Grandfather    • No Known Problems Paternal Grandmother    • No Known Problems Paternal Grandfather    • Diabetes Sister    • No Known Problems Sister    • No Known Problems Sister        Social History     Socioeconomic History   • Marital status: Single   Tobacco Use   • Smoking status: Current Every Day Smoker     Packs/day: 0.25     Years: 25.00     Pack years: 6.25     Types: Cigarettes     Start date: 1977   • Smokeless tobacco: Never Used   • Tobacco comment: CAFFEINE USE: 3 DIET PEPSI'S DAILY   Vaping Use   • Vaping Use: Never used   Substance and Sexual Activity   • Alcohol use: Yes     Alcohol/week: 1.0 standard drink     Types: 1 Shots of liquor per week     Comment: OCCASIONAL   • Drug use: No   • Sexual activity: Defer           Objective   Physical Exam  Vitals and nursing note reviewed.   Constitutional:       General: She is not in acute distress.     Appearance: Normal appearance. She is well-developed. She is not ill-appearing, toxic-appearing or diaphoretic.   HENT:      Head: Normocephalic and atraumatic.      Right Ear: Tympanic membrane, ear canal and external ear normal.      Left Ear: Tympanic membrane, ear canal  and external ear normal.      Nose: Nose normal.      Mouth/Throat:      Mouth: Mucous membranes are moist.      Pharynx: Oropharynx is clear.   Eyes:      Extraocular Movements: Extraocular movements intact.      Conjunctiva/sclera: Conjunctivae normal.      Pupils: Pupils are equal, round, and reactive to light.   Cardiovascular:      Rate and Rhythm: Normal rate and regular rhythm.      Pulses: Normal pulses.      Heart sounds: Normal heart sounds. No murmur heard.    No friction rub. No gallop.   Pulmonary:      Effort: Pulmonary effort is normal.      Breath sounds: Normal breath sounds. No decreased breath sounds, wheezing, rhonchi or rales.       Chest:      Chest wall: Tenderness present. No mass, deformity, swelling or crepitus.       Abdominal:      General: Bowel sounds are normal. There is no distension.      Palpations: Abdomen is soft. There is no mass.      Tenderness: There is no abdominal tenderness. There is no guarding or rebound.      Hernia: No hernia is present.   Musculoskeletal:         General: Normal range of motion.      Cervical back: Normal range of motion and neck supple.   Skin:     General: Skin is warm and dry.      Capillary Refill: Capillary refill takes less than 2 seconds.   Neurological:      General: No focal deficit present.      Mental Status: She is alert and oriented to person, place, and time.      Deep Tendon Reflexes: Reflexes are normal and symmetric.   Psychiatric:         Mood and Affect: Mood normal.         Behavior: Behavior normal.         Procedures           ED Course  ED Course as of 05/15/22 1313   Sun May 15, 2022   1202 Obtaining left rib series x-rays. [SS]   1307 No rib fracture seen.  Atelectasis present.  No specific source for patient's pain.  I feel patient's pain is musculoskeletal in nature.  Dr. Acosta prescribed methocarbamol and tramadol.  Patient reports these have not helped her pain.  She called her PCP who placed patient on oxycodone.  This  also is not helping her pain.  Recommend patient follow-up with her PCP if symptoms do not resolve in the next few days.  Discussed at length with patient all results, diagnosis, treatment follow-up.  Will DC home. [SS]      ED Course User Index  [SS] Diaz Benites MD      XR Ribs Left With PA Chest    Result Date: 5/15/2022  Narrative: CR Ribs 2 Vws W Chest LT INDICATION: Anterior left rib pain after falling one week ago COMPARISON: 5/7/2022 FINDINGS: PA view of the chest and oblique views of the left ribs. 8 total images. There is volume loss at the left lung base with blunting of the costophrenic angle likely representing atelectasis. This has worsened since the chest x-ray of 5/7/2022. No pneumothorax. No rib fractures are seen. No destructive bone lesions are noted.     Impression: Increased atelectasis on the left likely from splinting. No rib fractures are seen. Signer Name: Aba Valle MD  Signed: 5/15/2022 12:54 PM  Workstation Name: [email or web address]  [facility]    XR Ribs Left With PA Chest    Result Date: 5/7/2022  Narrative: CR Ribs 2 Vws W Chest LT INDICATION: Fall last night with left rib pain COMPARISON: No pertinent prior study FINDINGS: PA view of the chest and oblique views of the left ribs. 5 total images. There is no evidence of a displaced rib fracture. The adjacent lung demonstrates minimal discoid atelectasis.     Impression: No evidence of a left rib fracture. Minimal discoid atelectasis in the left lung base. Signer Name: Mark Livingston MD  Signed: 5/7/2022 3:49 PM  Workstation Name: [id number]  [facility]    My differential diagnosis for chest pain includes but is not limited to:  Muscle strain, costochondritis, myositis, pleurisy, rib fracture, intercostal neuritis, herpes zoster, tumor, myocardial infarction, coronary syndrome, unstable angina, angina, aortic dissection, mitral valve prolapse, pericarditis, palpitations, pulmonary  embolus, pneumonia, pneumothorax, lung cancer, GERD, esophagitis, esophageal spasm                                             MDM    Final diagnoses:   Left-sided chest wall pain       ED Disposition  ED Disposition     ED Disposition   Discharge    Condition   Stable    Comment   --             Analy Tyler, APRN  [address] [facility] [id number] [phone]    Schedule an appointment as soon as possible for a visit in 1 week  for continued or worsened symptoms, Sooner if needed         Medication List      No changes were made to your prescriptions during this visit.          Diaz Benites MD  05/15/22 9105

## 2022-06-08 NOTE — PROGRESS NOTES
Date of Office Visit: 2022  Encounter Provider: MARCELLE Headley  Place of Service: [facility] CARDIOLOGY  Patient Name: Chiquita Hebert  :1960    Chief Complaint   Patient presents with   • Coronary Artery Disease   • Follow-up   :     HPI: Chiquita Hebret is a 61 y.o. female who is a patient of  Dr. Moon.  She is new to me today and presents for a 1 year follow-up.  She has a history of coronary artery disease status post LAD stent placement, hypertension, hyperlipidemia, tobacco use.  She was previously followed by Dr. Dodd.  Her last cardiac work-up here included an echocardiogram in May 2018 showed normal LV systolic function and no valvular disease.      Today she presents with no complaints of chest pain, palpitations, shortness of air or dizziness.  She has been taking Wellbutrin and has decreased her smoking from 1 pack/day to 5 to 6 cigarettes.  Blood pressure is well controlled.  EKG stable.      Previous testing and notes have been reviewed by me.   Past Medical History:   Diagnosis Date   • Anxiety    • Chest pain    • Colon polyp    • Constipation    • Fatigue    • Gastritis    • GERD (gastroesophageal reflux disease)    • H/O esophagogastroduodenoscopy 2010    Diagnostic    • Heart disease    • Heart murmur    • Heart palpitations    • Hyperlipidemia    • Hypertension    • MI (myocardial infarction) ([facility])            Past Surgical History:   Procedure Laterality Date   • ANGIOPLASTY      Cath Stent Placement.  Stenting of the LAD   • CARDIAC CATHETERIZATION     • CERVICAL EPIDURAL N/A 3/24/2022    Procedure: CERVICAL EPIDURAL .. likely x2, 2-3 weeks apart;  Surgeon: Vladislav Diehl MD;  Location: [facility] OR;  Service: Pain Management;  Laterality: N/A;   • COLONOSCOPY N/A 2017    Procedure: COLONOSCOPY WITH BIOPSIES; POLYPECTOMY;  Surgeon: Joceline Singh MD;  Location: [facility] OR;  Service:    • CORONARY ANGIOPLASTY     • CORONARY  STENT PLACEMENT     • HYSTERECTOMY     • UPPER GASTROINTESTINAL ENDOSCOPY         Social History     Socioeconomic History   • Marital status: Single   Tobacco Use   • Smoking status: Current Every Day Smoker     Packs/day: 0.25     Years: 25.00     Pack years: 6.25     Types: Cigarettes     Start date: 1977   • Smokeless tobacco: Never Used   • Tobacco comment: CAFFEINE USE: 3 DIET PEPSI'S DAILY   Vaping Use   • Vaping Use: Never used   Substance and Sexual Activity   • Alcohol use: Yes     Alcohol/week: 1.0 standard drink     Types: 1 Shots of liquor per week     Comment: OCCASIONAL   • Drug use: No   • Sexual activity: Defer       Family History   Problem Relation Age of Onset   • Hypertension Mother    • Stroke Mother    • Thyroid disease Mother    • Heart disease Father    • Hypertension Father    • Cancer Sister    • Diabetes Sister    • No Known Problems Maternal Grandmother    • No Known Problems Maternal Grandfather    • No Known Problems Paternal Grandmother    • No Known Problems Paternal Grandfather    • Diabetes Sister    • No Known Problems Sister    • No Known Problems Sister        Review of Systems   Constitutional: Negative.   HENT: Negative.    Eyes: Negative.    Cardiovascular: Negative.    Respiratory: Negative.    Endocrine: Negative.    Hematologic/Lymphatic:        Plavix   Skin: Negative.    Musculoskeletal: Negative.    Gastrointestinal: Negative.    Genitourinary: Negative.    Neurological: Negative.    Psychiatric/Behavioral: Negative.    Allergic/Immunologic: Negative.        Allergies   Allergen Reactions   • Penicillins Rash         Current Outpatient Medications:   •  aspirin 81 MG tablet, Take 1 tablet by mouth daily., Disp: , Rfl:   •  buPROPion SR (WELLBUTRIN SR) 150 MG 12 hr tablet, Take 150 mg by mouth 2 (Two) Times a Day., Disp: , Rfl:   •  buPROPion XL (WELLBUTRIN XL) 300 MG 24 hr tablet, Take 300 mg by mouth Daily., Disp: , Rfl:   •  busPIRone (BUSPAR) 10 MG tablet, Take 10 mg  "by mouth 2 (Two) Times a Day., Disp: , Rfl:   •  cetirizine (zyrTEC) 10 MG tablet, Take 10 mg by mouth., Disp: , Rfl:   •  cholecalciferol (VITAMIN D3) 25 MCG (1000 UT) tablet, Take 1,000 Units by mouth Daily., Disp: , Rfl:   •  clopidogrel (PLAVIX) 75 MG tablet, Take 1 tablet by mouth once daily, Disp: 90 tablet, Rfl: 3  •  Coenzyme Q10 10 MG capsule, Take  by mouth., Disp: , Rfl:   •  dicyclomine (BENTYL) 10 MG capsule, Take 10 mg by mouth 4 (Four) Times a Day Before Meals & at Bedtime., Disp: , Rfl:   •  escitalopram (LEXAPRO) 20 MG tablet, Take 20 mg by mouth Daily., Disp: , Rfl:   •  gabapentin (NEURONTIN) 400 MG capsule, Take 2 capsules by mouth Daily., Disp: , Rfl:   •  lisinopril (PRINIVIL,ZESTRIL) 5 MG tablet, Take 1 tablet by mouth daily., Disp: , Rfl:   •  meloxicam (MOBIC) 15 MG tablet, Take 15 mg by mouth Daily., Disp: , Rfl:   •  montelukast (SINGULAIR) 10 MG tablet, Take 10 mg by mouth Every Night., Disp: , Rfl:   •  multivitamin with minerals tablet tablet, Take 1 tablet by mouth Daily., Disp: , Rfl:   •  omeprazole (priLOSEC) 40 MG capsule, Take 40 mg by mouth Daily., Disp: , Rfl:   •  ondansetron (ZOFRAN) 8 MG tablet, TAKE 1 TABLET BY MOUTH EVERY 8 HOURS AS NEEDED FOR NAUSEA FOR UP TO 7 DAYS, Disp: , Rfl:   •  rosuvastatin (CRESTOR) 10 MG tablet, Take 40 mg by mouth Daily., Disp: , Rfl:   •  vitamin B-12 (CYANOCOBALAMIN) 1000 MCG tablet, Take 1,000 mcg by mouth Daily., Disp: , Rfl:       Objective:     Vitals:    06/09/22 0953   BP: 128/88   BP Location: Left arm   Patient Position: Sitting   Pulse: 63   SpO2: 98%   Weight: 72.4 kg (159 lb 9.6 oz)   Height: 165.1 cm (65\")     Body mass index is 26.56 kg/m².     2D Echocardiogram 5/1/2017:  LVEF 59%, normal diastolic function  Normal RV size and systolic function  No significant valvular disease    PHYSICAL EXAM:    Constitutional:       Appearance: Healthy appearance. Not in distress.   Neck:      Vascular: No JVR. JVD normal.   Pulmonary:      " Effort: Pulmonary effort is normal.      Breath sounds: Diffuse Wheezing present. No rhonchi. No rales.   Chest:      Chest wall: Not tender to palpatation.   Cardiovascular:      PMI at left midclavicular line. Normal rate. Regular rhythm. Normal S1. Normal S2.      Murmurs: There is no murmur.      No gallop. No click. No rub.   Pulses:     Intact distal pulses.   Edema:     Peripheral edema absent.   Abdominal:      General: Bowel sounds are normal.      Palpations: Abdomen is soft.      Tenderness: There is no abdominal tenderness.   Musculoskeletal: Normal range of motion.         General: No tenderness. Skin:     General: Skin is warm and dry.   Neurological:      General: No focal deficit present.      Mental Status: Alert and oriented to person, place and time.           ECG 12 Lead    Date/Time: 6/9/2022 10:34 AM  Performed by: Sophie Fischer APRN  Authorized by: Sophie Fischer APRN   Comparison: compared with previous ECG from 5/14/2021  Similar to previous ECG  Rhythm: sinus rhythm  Ectopy: atrial premature contractions  Rate: normal  BPM: 63  ST Segments: ST segments normal  T Waves: T waves normal                Assessment:       Diagnosis Plan   1. Coronary artery disease involving native coronary artery of native heart without angina pectoris     2. Hyperlipidemia, unspecified hyperlipidemia type     3. History of coronary artery stent placement     4. Essential hypertension     5. Tobacco abuse       No orders of the defined types were placed in this encounter.         Plan:       1.  Coronary artery disease: Remote history of stent to LAD.  No angina.  Stable EKG.  Continues on Plavix indefinitely.  2.  Hypertension: Controlled on current medication  3.  Hyperlipidemia: Lipid panel in target range.  On statin therapy  4.  Tobacco use: Oertli taking Wellbutrin and has decreased from 1 pack/day to 5 to 6 cigarettes a day    Ms. Hebert will follow up with Dr. Moon in 1 year.  She will call  sooner for any questions or concerns.          Your medication list          Accurate as of June 9, 2022 10:20 AM. If you have any questions, ask your nurse or doctor.            CONTINUE taking these medications      Instructions Last Dose Given Next Dose Due   aspirin 81 MG tablet      Take 1 tablet by mouth daily.       buPROPion  MG 24 hr tablet  Commonly known as: WELLBUTRIN XL      Take 300 mg by mouth Daily.       buPROPion  MG 12 hr tablet  Commonly known as: WELLBUTRIN SR      Take 150 mg by mouth 2 (Two) Times a Day.       busPIRone 10 MG tablet  Commonly known as: BUSPAR      Take 10 mg by mouth 2 (Two) Times a Day.       cetirizine 10 MG tablet  Commonly known as: zyrTEC      Take 10 mg by mouth.       cholecalciferol 25 MCG (1000 UT) tablet  Commonly known as: VITAMIN D3      Take 1,000 Units by mouth Daily.       clopidogrel 75 MG tablet  Commonly known as: PLAVIX      Take 1 tablet by mouth once daily       Coenzyme Q10 10 MG capsule      Take  by mouth.       dicyclomine 10 MG capsule  Commonly known as: BENTYL      Take 10 mg by mouth 4 (Four) Times a Day Before Meals & at Bedtime.       escitalopram 20 MG tablet  Commonly known as: LEXAPRO      Take 20 mg by mouth Daily.       gabapentin 400 MG capsule  Commonly known as: NEURONTIN      Take 2 capsules by mouth Daily.       lisinopril 5 MG tablet  Commonly known as: PRINIVIL,ZESTRIL      Take 1 tablet by mouth daily.       meloxicam 15 MG tablet  Commonly known as: MOBIC      Take 15 mg by mouth Daily.       montelukast 10 MG tablet  Commonly known as: SINGULAIR      Take 10 mg by mouth Every Night.       multivitamin with minerals tablet tablet      Take 1 tablet by mouth Daily.       omeprazole 40 MG capsule  Commonly known as: priLOSEC      Take 40 mg by mouth Daily.       ondansetron 8 MG tablet  Commonly known as: ZOFRAN      TAKE 1 TABLET BY MOUTH EVERY 8 HOURS AS NEEDED FOR NAUSEA FOR UP TO 7 DAYS       rosuvastatin 10 MG  tablet  Commonly known as: CRESTOR      Take 40 mg by mouth Daily.       vitamin B-12 1000 MCG tablet  Commonly known as: CYANOCOBALAMIN      Take 1,000 mcg by mouth Daily.                As always, it has been a pleasure to participate in your patient's care.      Sincerely,       MARCELLE Benítez

## 2022-06-09 ENCOUNTER — OFFICE VISIT (OUTPATIENT)
Dept: CARDIOLOGY | Facility: CLINIC | Age: 62
End: 2022-06-09

## 2022-06-09 VITALS
BODY MASS INDEX: 26.59 KG/M2 | OXYGEN SATURATION: 98 % | WEIGHT: 159.6 LBS | SYSTOLIC BLOOD PRESSURE: 128 MMHG | HEART RATE: 63 BPM | HEIGHT: 65 IN | DIASTOLIC BLOOD PRESSURE: 88 MMHG

## 2022-06-09 DIAGNOSIS — Z72.0 TOBACCO ABUSE: ICD-10-CM

## 2022-06-09 DIAGNOSIS — I25.10 CORONARY ARTERY DISEASE INVOLVING NATIVE CORONARY ARTERY OF NATIVE HEART WITHOUT ANGINA PECTORIS: Primary | ICD-10-CM

## 2022-06-09 DIAGNOSIS — E78.5 HYPERLIPIDEMIA, UNSPECIFIED HYPERLIPIDEMIA TYPE: ICD-10-CM

## 2022-06-09 DIAGNOSIS — Z95.5 HISTORY OF CORONARY ARTERY STENT PLACEMENT: ICD-10-CM

## 2022-06-09 DIAGNOSIS — I10 ESSENTIAL HYPERTENSION: ICD-10-CM

## 2022-06-09 PROCEDURE — 93000 ELECTROCARDIOGRAM COMPLETE: CPT | Performed by: NURSE PRACTITIONER

## 2022-06-09 PROCEDURE — 99214 OFFICE O/P EST MOD 30 MIN: CPT | Performed by: NURSE PRACTITIONER

## 2022-06-09 RX ORDER — CLOPIDOGREL BISULFATE 75 MG/1
75 TABLET ORAL DAILY
Qty: 90 TABLET | Refills: 3 | Status: SHIPPED | OUTPATIENT
Start: 2022-06-09

## 2023-06-01 RX ORDER — CLOPIDOGREL BISULFATE 75 MG/1
TABLET ORAL
Qty: 90 TABLET | Refills: 1 | Status: SHIPPED | OUTPATIENT
Start: 2023-06-01

## 2023-12-08 RX ORDER — CLOPIDOGREL BISULFATE 75 MG/1
TABLET ORAL
Qty: 90 TABLET | Refills: 0 | Status: SHIPPED | OUTPATIENT
Start: 2023-12-08

## 2024-01-16 ENCOUNTER — TELEPHONE (OUTPATIENT)
Dept: CARDIOLOGY | Facility: CLINIC | Age: 64
End: 2024-01-16
Payer: COMMERCIAL

## 2024-01-16 NOTE — TELEPHONE ENCOUNTER
01/16/2024: Patient on waitlist // Calling to see if she waited to be seen on 01/17/2024 by Simran IBANEZ

## 2024-01-17 ENCOUNTER — TELEPHONE (OUTPATIENT)
Dept: CARDIOLOGY | Facility: CLINIC | Age: 64
End: 2024-01-17
Payer: COMMERCIAL

## 2024-01-18 NOTE — PROGRESS NOTES
Subjective:     Encounter Date:01/19/2024      Patient ID: Chiquita Hebert is a 63 y.o. female.    Chief Complaint:  History of Present Illness    This is a 63-year-old with coronary artery disease status post prior LAD stent placement, hypertension, hyperlipidemia, tobacco use, who presents for follow-up.     I saw the patient last in 5/2021.  At that office visit she denies any further episodes of arm heaviness.  She was doing well from a cardiac standpoint.  Her lipid panel showed that her LDL had increased to 122.  She had already been switched from simvastatin to rosuvastatin 40 mg by MARCELLE Polanco prior to that office visit.  I did not recommend any other changes to her management.    She was last seen in the office by MARCELLE Benítez in 6/2022.  At that office visit she was doing well.  Her lipids had improved.  She had been started on bupropion for smoking cessation and was down to smoking 5 to 6 cigarettes a day.  No other changes were made to her management.    This is her first office visit since 6/2022.  Overall she continues to do well.  Denies any chest pain, shortness of breath, palpitations, orthopnea, near-syncope or syncope or lower extremity swelling.  She reports some issues with dizziness that she attributes to a history of vertigo.  She continues to smoke a half a pack a day.  She reports that she is just unable to quit at this time.     Prior History:  Patient was previously followed by Dr. Dodd.  She has a remote history of and a proximal LAD stent placement which was placed at [facility] several years ago.  She reports at that time she had symptoms of substernal chest burning with exertion.  She subsequently underwent a stress test which was abnormal which then led to a cardiac catheterization and stent placement.  Her last cardiac work-up here included an echocardiogram in 5/2018 which showed normal left ventricular systolic function wall motion with an EF of 59%, normal  diastolic function, and no valvular disease.  She has done pretty well and follow-ups.  The main issue is been lifestyle modifications including smoking cessation.  She has been tried on Chantix in the past which she reports had worked initially but then she started smoking again.  She was tried on Chantix again in the last couple years but the medication was too expensive.  She was last seen in the office by Dr. Dodd in 4/2018.  She was then seen by BUCKY Teran in 4/2019.  At that time they again discussed lifestyle modifications including heart healthy diet and tobacco cessation.     Her last follow-up was with MARCELLE Diaz on 3/31/2020 for complaints of bilateral arm heaviness.  She admitted that the symptoms were different from her prior anginal symptoms.  Symptoms occurred at any time and were not necessarily associated with exertion.  Due to the atypical sounding symptoms is recommended that we would just monitor her symptoms at that time.     I saw her initially in 5/2020 for routine follow-up.  Since her prior office visit she had only had one episode of bilateral arm heaviness.  Since her symptoms were improving I recommended just monitoring them at that time.      Review of Systems   Constitutional: Negative for malaise/fatigue.   HENT:  Negative for hearing loss, hoarse voice, nosebleeds and sore throat.    Eyes:  Negative for pain.   Cardiovascular:  Negative for chest pain, claudication, cyanosis, dyspnea on exertion, irregular heartbeat, leg swelling, near-syncope, orthopnea, palpitations, paroxysmal nocturnal dyspnea and syncope.   Respiratory:  Negative for shortness of breath and snoring.    Endocrine: Negative for cold intolerance, heat intolerance, polydipsia, polyphagia and polyuria.   Skin:  Negative for itching and rash.   Musculoskeletal:  Negative for arthritis, falls, joint pain, joint swelling, muscle cramps, muscle weakness and myalgias.   Gastrointestinal:  Negative for  constipation, diarrhea, dysphagia, heartburn, hematemesis, hematochezia, melena, nausea and vomiting.   Genitourinary:  Negative for frequency, hematuria and hesitancy.   Neurological:  Positive for dizziness. Negative for excessive daytime sleepiness, headaches, light-headedness, numbness and weakness.   Psychiatric/Behavioral:  Negative for depression. The patient is not nervous/anxious.          Current Outpatient Medications:     aspirin 81 MG tablet, Take 1 tablet by mouth Daily., Disp: , Rfl:     buPROPion SR (WELLBUTRIN SR) 150 MG 12 hr tablet, Take 1 tablet by mouth Daily., Disp: , Rfl:     buPROPion XL (WELLBUTRIN XL) 300 MG 24 hr tablet, Take 1 tablet by mouth Daily., Disp: , Rfl:     busPIRone (BUSPAR) 10 MG tablet, Take 1 tablet by mouth 2 (Two) Times a Day., Disp: , Rfl:     cetirizine (zyrTEC) 10 MG tablet, Take 1 tablet by mouth., Disp: , Rfl:     cholecalciferol (VITAMIN D3) 25 MCG (1000 UT) tablet, Take 1 tablet by mouth Daily., Disp: , Rfl:     clopidogrel (PLAVIX) 75 MG tablet, Take 1 tablet by mouth once daily, Disp: 90 tablet, Rfl: 0    Coenzyme Q10 10 MG capsule, Take  by mouth., Disp: , Rfl:     dicyclomine (BENTYL) 10 MG capsule, Take 1 capsule by mouth 4 (Four) Times a Day Before Meals & at Bedtime., Disp: , Rfl:     escitalopram (LEXAPRO) 20 MG tablet, Take 1 tablet by mouth Daily., Disp: , Rfl:     gabapentin (NEURONTIN) 400 MG capsule, Take 2 capsules by mouth Daily., Disp: , Rfl:     lisinopril (PRINIVIL,ZESTRIL) 5 MG tablet, Take 1 tablet by mouth Daily., Disp: , Rfl:     meloxicam (MOBIC) 15 MG tablet, Take 1 tablet by mouth Daily., Disp: , Rfl:     montelukast (SINGULAIR) 10 MG tablet, Take 1 tablet by mouth Every Night., Disp: , Rfl:     multivitamin with minerals tablet tablet, Take 1 tablet by mouth Daily., Disp: , Rfl:     omeprazole (priLOSEC) 40 MG capsule, Take 1 capsule by mouth Daily., Disp: , Rfl:     ondansetron (ZOFRAN) 8 MG tablet, TAKE 1 TABLET BY MOUTH EVERY 8 HOURS AS  NEEDED FOR NAUSEA FOR UP TO 7 DAYS, Disp: , Rfl:     rosuvastatin (CRESTOR) 10 MG tablet, Take 4 tablets by mouth Daily., Disp: , Rfl:     vitamin B-12 (CYANOCOBALAMIN) 1000 MCG tablet, Take 1 tablet by mouth Daily., Disp: , Rfl:     Past Medical History:   Diagnosis Date    Anxiety     Chest pain     Colon polyp     Constipation     Fatigue     Gastritis     GERD (gastroesophageal reflux disease)     H/O esophagogastroduodenoscopy 01/30/2010    Diagnostic     Heart disease     Heart murmur     Heart palpitations     Hyperlipidemia     Hypertension     MI (myocardial infarction)     2012       Past Surgical History:   Procedure Laterality Date    ANGIOPLASTY      Cath Stent Placement.  Stenting of the LAD    CARDIAC CATHETERIZATION      CERVICAL EPIDURAL N/A 3/24/2022    Procedure: CERVICAL EPIDURAL .. likely x2, 2-3 weeks apart;  Surgeon: Vladislav Diehl MD;  Location: [facility] OR;  Service: Pain Management;  Laterality: N/A;    COLONOSCOPY N/A 12/27/2017    Procedure: COLONOSCOPY WITH BIOPSIES; POLYPECTOMY;  Surgeon: Joceline Singh MD;  Location: [facility] OR;  Service:     CORONARY ANGIOPLASTY      CORONARY STENT PLACEMENT      HYSTERECTOMY      UPPER GASTROINTESTINAL ENDOSCOPY         Family History   Problem Relation Age of Onset    Hypertension Mother     Stroke Mother     Thyroid disease Mother     Heart disease Father     Hypertension Father     Cancer Sister     Diabetes Sister     No Known Problems Maternal Grandmother     No Known Problems Maternal Grandfather     No Known Problems Paternal Grandmother     No Known Problems Paternal Grandfather     Diabetes Sister     No Known Problems Sister     No Known Problems Sister        Social History     Tobacco Use    Smoking status: Every Day     Packs/day: 0.25     Years: 25.00     Additional pack years: 0.00     Total pack years: 6.25     Types: Cigarettes     Start date: 1977    Smokeless tobacco: Never    Tobacco comments:     CAFFEINE USE: 3 DIET PEPSI'S  "DAILY   Vaping Use    Vaping Use: Never used   Substance Use Topics    Alcohol use: Yes     Alcohol/week: 1.0 standard drink of alcohol     Types: 1 Shots of liquor per week     Comment: OCCASIONAL    Drug use: No         ECG 12 Lead    Date/Time: 1/19/2024 11:17 AM  Performed by: Gwen Moon MD    Authorized by: Gwen Moon MD  Comparison: compared with previous ECG   Similar to previous ECG  Rhythm: sinus rhythm  Ectopy: atrial premature contractions             Objective:     Visit Vitals  /80 (BP Location: Left arm, Patient Position: Sitting, Cuff Size: Adult)   Pulse 73   Ht 165.1 cm (65\")   Wt 71.6 kg (157 lb 12.8 oz)   SpO2 97%   BMI 26.26 kg/m²         Constitutional:       Appearance: Normal appearance. Well-developed.   Eyes:      General: Lids are normal.      Conjunctiva/sclera: Conjunctivae normal.      Pupils: Pupils are equal, round, and reactive to light.   HENT:      Head: Normocephalic and atraumatic.   Neck:      Vascular: No carotid bruit or JVD.      Lymphadenopathy: No cervical adenopathy.   Pulmonary:      Effort: Pulmonary effort is normal.      Breath sounds: Normal breath sounds.   Cardiovascular:      Normal rate. Regular rhythm.      No gallop.    Pulses:     Radial: 2+ bilaterally.  Edema:     Peripheral edema absent.   Abdominal:      Palpations: Abdomen is soft.   Musculoskeletal:      Cervical back: Full passive range of motion without pain, normal range of motion and neck supple. Skin:     General: Skin is warm and dry.   Neurological:      Mental Status: Alert and oriented to person, place, and time.             Assessment:          Diagnosis Plan   1. Coronary artery disease involving native coronary artery of native heart without angina pectoris        2. Essential hypertension        3. History of coronary artery stent placement        4. Hyperlipidemia, unspecified hyperlipidemia type        5. Tobacco abuse               Plan:       1.  Coronary artery disease.  " Appears to be stable and asymptomatic.  Continue current medical management.  2.  Hypertension.  Well-controlled on current regimen medications.  Continue the same.  3.  Hyperlipidemia.  On rosuvastatin for goal LDL of 70 or below.  This is managed by MARCELLE Polanco.  4.  Tobacco use.  Still smoking half pack a day.  We did discuss smoking cessation.  She does not appear ready to quit quite yet.  Will continue to  her.    Will plan on seeing the patient back again in 1 year or sooner if further issues arise.

## 2024-01-19 ENCOUNTER — OFFICE VISIT (OUTPATIENT)
Age: 64
End: 2024-01-19
Payer: COMMERCIAL

## 2024-01-19 VITALS
HEIGHT: 65 IN | WEIGHT: 157.8 LBS | SYSTOLIC BLOOD PRESSURE: 122 MMHG | OXYGEN SATURATION: 97 % | BODY MASS INDEX: 26.29 KG/M2 | HEART RATE: 73 BPM | DIASTOLIC BLOOD PRESSURE: 80 MMHG

## 2024-01-19 DIAGNOSIS — E78.5 HYPERLIPIDEMIA, UNSPECIFIED HYPERLIPIDEMIA TYPE: ICD-10-CM

## 2024-01-19 DIAGNOSIS — I10 ESSENTIAL HYPERTENSION: ICD-10-CM

## 2024-01-19 DIAGNOSIS — Z72.0 TOBACCO ABUSE: ICD-10-CM

## 2024-01-19 DIAGNOSIS — Z95.5 HISTORY OF CORONARY ARTERY STENT PLACEMENT: ICD-10-CM

## 2024-01-19 DIAGNOSIS — I25.10 CORONARY ARTERY DISEASE INVOLVING NATIVE CORONARY ARTERY OF NATIVE HEART WITHOUT ANGINA PECTORIS: Primary | ICD-10-CM

## 2024-01-19 PROCEDURE — 99214 OFFICE O/P EST MOD 30 MIN: CPT | Performed by: INTERNAL MEDICINE

## 2024-01-19 PROCEDURE — 93000 ELECTROCARDIOGRAM COMPLETE: CPT | Performed by: INTERNAL MEDICINE

## 2024-03-04 RX ORDER — CLOPIDOGREL BISULFATE 75 MG/1
TABLET ORAL
Qty: 90 TABLET | Refills: 1 | Status: SHIPPED | OUTPATIENT
Start: 2024-03-04

## 2024-03-07 DIAGNOSIS — I65.23 BILATERAL CAROTID ARTERY STENOSIS: Primary | ICD-10-CM

## 2024-03-29 ENCOUNTER — OFFICE VISIT (OUTPATIENT)
Age: 64
End: 2024-03-29
Payer: COMMERCIAL

## 2024-03-29 ENCOUNTER — HOSPITAL ENCOUNTER (OUTPATIENT)
Facility: HOSPITAL | Age: 64
Discharge: HOME | End: 2024-03-29
Payer: COMMERCIAL

## 2024-03-29 VITALS
BODY MASS INDEX: 24.99 KG/M2 | WEIGHT: 150 LBS | DIASTOLIC BLOOD PRESSURE: 80 MMHG | HEIGHT: 65 IN | SYSTOLIC BLOOD PRESSURE: 120 MMHG

## 2024-03-29 DIAGNOSIS — I65.23 BILATERAL CAROTID ARTERY STENOSIS: Primary | ICD-10-CM

## 2024-03-29 DIAGNOSIS — I65.23 CAROTID STENOSIS, BILATERAL: ICD-10-CM

## 2024-03-29 DIAGNOSIS — I65.23 BILATERAL CAROTID ARTERY STENOSIS: ICD-10-CM

## 2024-03-29 LAB
BH CV XLRA MEAS LEFT CAROTID BULB EDV: 30.6 CM/SEC
BH CV XLRA MEAS LEFT CAROTID BULB PSV: 80.7 CM/SEC
BH CV XLRA MEAS LEFT DIST CCA EDV: -28.6 CM/SEC
BH CV XLRA MEAS LEFT DIST CCA PSV: -82.3 CM/SEC
BH CV XLRA MEAS LEFT DIST ICA EDV: -36.8 CM/SEC
BH CV XLRA MEAS LEFT DIST ICA PSV: -103.6 CM/SEC
BH CV XLRA MEAS LEFT ICA/CCA RATIO: 1.75
BH CV XLRA MEAS LEFT MID CCA EDV: -40.7 CM/SEC
BH CV XLRA MEAS LEFT MID CCA PSV: -110 CM/SEC
BH CV XLRA MEAS LEFT MID ICA EDV: 62 CM/SEC
BH CV XLRA MEAS LEFT MID ICA PSV: 144.3 CM/SEC
BH CV XLRA MEAS LEFT PROX CCA EDV: 35.5 CM/SEC
BH CV XLRA MEAS LEFT PROX CCA PSV: 110.9 CM/SEC
BH CV XLRA MEAS LEFT PROX ECA EDV: 32.9 CM/SEC
BH CV XLRA MEAS LEFT PROX ECA PSV: 171.9 CM/SEC
BH CV XLRA MEAS LEFT PROX ICA EDV: -36.8 CM/SEC
BH CV XLRA MEAS LEFT PROX ICA PSV: -88.6 CM/SEC
BH CV XLRA MEAS LEFT PROX SCLA PSV: 163.3 CM/SEC
BH CV XLRA MEAS LEFT VERTEBRAL A EDV: 21.2 CM/SEC
BH CV XLRA MEAS LEFT VERTEBRAL A PSV: 59.6 CM/SEC
BH CV XLRA MEAS RIGHT CAROTID BULB EDV: 48.4 CM/SEC
BH CV XLRA MEAS RIGHT CAROTID BULB PSV: 137.5 CM/SEC
BH CV XLRA MEAS RIGHT DIST CCA EDV: 20.4 CM/SEC
BH CV XLRA MEAS RIGHT DIST CCA PSV: 78.4 CM/SEC
BH CV XLRA MEAS RIGHT DIST ICA EDV: -34 CM/SEC
BH CV XLRA MEAS RIGHT DIST ICA PSV: -110.9 CM/SEC
BH CV XLRA MEAS RIGHT ICA/CCA RATIO: 2.03
BH CV XLRA MEAS RIGHT MID CCA EDV: -32.9 CM/SEC
BH CV XLRA MEAS RIGHT MID CCA PSV: -121.1 CM/SEC
BH CV XLRA MEAS RIGHT MID ICA EDV: -56.1 CM/SEC
BH CV XLRA MEAS RIGHT MID ICA PSV: -140.2 CM/SEC
BH CV XLRA MEAS RIGHT PROX CCA EDV: 32 CM/SEC
BH CV XLRA MEAS RIGHT PROX CCA PSV: 134.6 CM/SEC
BH CV XLRA MEAS RIGHT PROX ECA EDV: -28.2 CM/SEC
BH CV XLRA MEAS RIGHT PROX ECA PSV: -91.7 CM/SEC
BH CV XLRA MEAS RIGHT PROX ICA EDV: -72 CM/SEC
BH CV XLRA MEAS RIGHT PROX ICA PSV: -159.8 CM/SEC
BH CV XLRA MEAS RIGHT PROX SCLA PSV: 135.6 CM/SEC
BH CV XLRA MEAS RIGHT VERTEBRAL A EDV: -24.2 CM/SEC
BH CV XLRA MEAS RIGHT VERTEBRAL A PSV: -59.1 CM/SEC
LEFT ARM BP: NORMAL MMHG
RIGHT ARM BP: NORMAL MMHG

## 2024-03-29 PROCEDURE — 93880 EXTRACRANIAL BILAT STUDY: CPT

## 2024-03-29 NOTE — PROGRESS NOTES
Chief Complaint  Carotid Artery Disease    Subjective        Chiquita Hebert presents to [facility] VASCULAR SURGERY  HPI   Chiquita Hebert is a 63 y.o. female that has been followed in our office for carotid artery stenosis. She returns today in follow up along with a carotid duplex. She  reports she has been doing well without hospitalizations or surgeries. She denies any symptoms consistent with CVA, TIA, or amaurosis fugax.     Review of Systems   Constitutional:  Negative for fever.   Eyes:  Negative for visual disturbance.   Cardiovascular:  Negative for leg swelling.   Gastrointestinal:  Negative for abdominal pain.   Musculoskeletal:  Negative for back pain.   Skin:  Negative for color change, pallor and wound.   Neurological:  Negative for dizziness, facial asymmetry, speech difficulty and weakness.        Chiquita Hebert  reports that she has been smoking cigarettes. She started smoking about 47 years ago. She has a 11.8 pack-year smoking history. She has never used smokeless tobacco..   Tobacco Education/Cessation: Chiquita Hebert  reports that she has been smoking cigarettes. She started smoking about 47 years ago. She has a 11.8 pack-year smoking history. She has never used smokeless tobacco. I have educated her on the risk of diseases from using tobacco products such as arterial disease.          Objective   Vital Signs:  Vitals:    03/29/24 1055   BP: 120/80      Body mass index is 24.96 kg/m².       Physical Exam  Vitals reviewed.   Constitutional:       Appearance: Normal appearance.   HENT:      Head: Normocephalic.   Cardiovascular:      Rate and Rhythm: Normal rate and regular rhythm.      Pulses:           Dorsalis pedis pulses are 3+ on the right side and 3+ on the left side.        Posterior tibial pulses are 3+ on the right side and 3+ on the left side.   Pulmonary:      Effort: Pulmonary effort is normal.   Skin:     General: Skin is warm.   Neurological:      General: No focal  deficit present.      Mental Status: She is alert and oriented to person, place, and time.   Psychiatric:         Mood and Affect: Mood normal.          Result Review :    Duplex Carotid Ultrasound CAR (03/07/2024 17:10)     Previous carotid duplex: 50 to 69% stenosis bilaterally.    Carotid duplex from today: Less than 50% stenosis bilaterally.                   Assessment and Plan     Diagnoses and all orders for this visit:    1. Bilateral carotid artery stenosis (Primary)    2. Carotid stenosis, bilateral  -     Duplex Carotid Ultrasound CAR; Future             Patient has stable carotid artery stenosis, improved today from previous imaging.. She is to continue her antiplatelet agent which is aspirin.  She is also on Plavix.  She is on a statin for cholesterol control.  She continues to smoke and we discussed absolute smoking cessation.  We discussed adequate blood pressure control. She will return in 1 year along with a repeat carotid artery duplex.    Follow Up     Return in about 1 year (around 3/29/2025) for carotid duplex.  Patient was given instructions and counseling regarding her condition or for health maintenance advice. Please see specific information pulled into the AVS if appropriate.     MARCELLE Agarwal

## 2024-05-16 ENCOUNTER — OFFICE VISIT (OUTPATIENT)
Dept: CARDIOLOGY | Facility: CLINIC | Age: 64
End: 2024-05-16
Payer: COMMERCIAL

## 2024-05-16 VITALS
SYSTOLIC BLOOD PRESSURE: 118 MMHG | BODY MASS INDEX: 25.33 KG/M2 | HEIGHT: 65 IN | DIASTOLIC BLOOD PRESSURE: 72 MMHG | WEIGHT: 152 LBS | HEART RATE: 72 BPM

## 2024-05-16 DIAGNOSIS — I27.20 PULMONARY HYPERTENSION: Primary | ICD-10-CM

## 2024-05-16 RX ORDER — CEVIMELINE HYDROCHLORIDE 30 MG/1
CAPSULE ORAL
COMMUNITY
Start: 2024-05-14

## 2024-05-16 NOTE — PROGRESS NOTES
Subjective:     Encounter Date:05/16/2024      Patient ID: Chiquita Hebert is a 63 y.o. female.    Chief Complaint:follow up CAD  History of Present Illness  This is a 63-year-old female who follows with Dr. Moon and is new to me today.  She has a past medical history of coronary artery disease, hypertension, hyperlipidemia, and tobacco abuse.    She is here today for follow-up visit at the request of her PCP.  She recently underwent a routine CT of the chest for lung cancer screening and it was noted that her pulmonary artery is dilated and may suggest pulmonary hypertension.  She reports that she has been feeling well from a heart standpoint.  She denies any chest discomfort, worsening shortness of breath, or palpitations.  She has had some intermittent dizziness likely secondary to vertigo.  She says that it has improved with meclizine.  She denies any syncope.  She denies swelling in her lower extremities, orthopnea or PND.  Her blood pressures have been well-controlled.  She works at [employer] and is on her feet a lot throughout the day.  She denies any exertional chest discomfort or shortness of breath while at work.  She does admit that she is not very active outside of work.    Prior history:  She was previously a patient of Dr. Dodd. She has a remote history of a proximal LAD stent placement at [facility] several years ago.  She had presented with substernal chest burning with exertion.  She underwent a stress test which was abnormal and she subsequently underwent a cardiac catheterization where she was found to have the stenosis of the proximal LAD.    In May 2018 she had a echocardiogram that showed a normal left ventricular systolic function, no wall motion abnormalities and an EF of 59%, normal diastolic function and no significant valvular disease.    She was seen for the first time by Dr. Moon in May 2020.  At that time she was doing well and no changes were recommended.    Dr. Moon saw the  patient in January 2024 for follow-up visit.  Again from a cardiac standpoint she was stable.  Her biggest issues have been lifestyle modifications, particularly with smoking cessation.  She has been tried on Chantix and is currently on Wellbutrin.    I have reviewed and updated as appropriate allergies, current medications, past family history, past medical history, past surgical history and problem list.    Review of Systems   Constitutional: Negative for fever, malaise/fatigue, weight gain and weight loss.   HENT:  Negative for congestion, hoarse voice and sore throat.    Eyes:  Negative for blurred vision and double vision.   Cardiovascular:  Negative for chest pain, dyspnea on exertion, leg swelling, orthopnea, palpitations and syncope.   Respiratory:  Negative for cough, shortness of breath and wheezing.    Gastrointestinal:  Negative for abdominal pain, hematemesis, hematochezia and melena.   Genitourinary:  Negative for dysuria and hematuria.   Neurological:  Negative for dizziness, headaches, light-headedness and numbness.   Psychiatric/Behavioral:  Negative for depression. The patient is not nervous/anxious.          Current Outpatient Medications:     aspirin 81 MG tablet, Take 1 tablet by mouth Daily., Disp: , Rfl:     buPROPion SR (WELLBUTRIN SR) 150 MG 12 hr tablet, Take 1 tablet by mouth Daily., Disp: , Rfl:     buPROPion XL (WELLBUTRIN XL) 300 MG 24 hr tablet, Take 1 tablet by mouth Daily., Disp: , Rfl:     busPIRone (BUSPAR) 10 MG tablet, Take 1 tablet by mouth 2 (Two) Times a Day., Disp: , Rfl:     cetirizine (zyrTEC) 10 MG tablet, Take 1 tablet by mouth., Disp: , Rfl:     cevimeline (EVOXAC) 30 MG capsule, , Disp: , Rfl:     cholecalciferol (VITAMIN D3) 25 MCG (1000 UT) tablet, Take 1 tablet by mouth Daily., Disp: , Rfl:     clopidogrel (PLAVIX) 75 MG tablet, Take 1 tablet by mouth once daily, Disp: 90 tablet, Rfl: 1    Coenzyme Q10 10 MG capsule, Take  by mouth., Disp: , Rfl:     dicyclomine  (BENTYL) 10 MG capsule, Take 1 capsule by mouth 4 (Four) Times a Day Before Meals & at Bedtime., Disp: , Rfl:     escitalopram (LEXAPRO) 20 MG tablet, Take 1 tablet by mouth Daily., Disp: , Rfl:     gabapentin (NEURONTIN) 400 MG capsule, Take 2 capsules by mouth Daily., Disp: , Rfl:     lisinopril (PRINIVIL,ZESTRIL) 5 MG tablet, Take 1 tablet by mouth Daily., Disp: , Rfl:     meloxicam (MOBIC) 15 MG tablet, Take 1 tablet by mouth Daily., Disp: , Rfl:     montelukast (SINGULAIR) 10 MG tablet, Take 1 tablet by mouth Every Night., Disp: , Rfl:     multivitamin with minerals tablet tablet, Take 1 tablet by mouth Daily., Disp: , Rfl:     omeprazole (priLOSEC) 40 MG capsule, Take 1 capsule by mouth Daily., Disp: , Rfl:     ondansetron (ZOFRAN) 8 MG tablet, TAKE 1 TABLET BY MOUTH EVERY 8 HOURS AS NEEDED FOR NAUSEA FOR UP TO 7 DAYS, Disp: , Rfl:     rosuvastatin (CRESTOR) 10 MG tablet, Take 4 tablets by mouth Daily., Disp: , Rfl:     vitamin B-12 (CYANOCOBALAMIN) 1000 MCG tablet, Take 1 tablet by mouth Daily., Disp: , Rfl:     Past Medical History:   Diagnosis Date    Anxiety     Chest pain     Colon polyp     Constipation     Fatigue     Gastritis     GERD (gastroesophageal reflux disease)     H/O esophagogastroduodenoscopy 01/30/2010    Diagnostic     Heart disease     Heart murmur     Heart palpitations     Hyperlipidemia     Hypertension     MI (myocardial infarction)     2012       Past Surgical History:   Procedure Laterality Date    ANGIOPLASTY      Cath Stent Placement.  Stenting of the LAD    CARDIAC CATHETERIZATION      CERVICAL EPIDURAL N/A 3/24/2022    Procedure: CERVICAL EPIDURAL .. likely x2, 2-3 weeks apart;  Surgeon: Vladislav Diehl MD;  Location: [facility] MAIN OR;  Service: Pain Management;  Laterality: N/A;    COLONOSCOPY N/A 12/27/2017    Procedure: COLONOSCOPY WITH BIOPSIES; POLYPECTOMY;  Surgeon: Joceline Singh MD;  Location: [facility] OR;  Service:     CORONARY ANGIOPLASTY      CORONARY STENT PLACEMENT    "   HYSTERECTOMY      UPPER GASTROINTESTINAL ENDOSCOPY         Family History   Problem Relation Age of Onset    Hypertension Mother     Stroke Mother     Thyroid disease Mother     Heart disease Father     Hypertension Father     Cancer Sister     Diabetes Sister     No Known Problems Maternal Grandmother     No Known Problems Maternal Grandfather     No Known Problems Paternal Grandmother     No Known Problems Paternal Grandfather     Diabetes Sister     No Known Problems Sister     No Known Problems Sister        Social History     Tobacco Use    Smoking status: Every Day     Current packs/day: 0.25     Average packs/day: 0.3 packs/day for 47.4 years (11.8 ttl pk-yrs)     Types: Cigarettes     Start date: 1977    Smokeless tobacco: Never    Tobacco comments:     CAFFEINE USE: 3 DIET PEPSI'S DAILY   Vaping Use    Vaping status: Never Used   Substance Use Topics    Alcohol use: Yes     Alcohol/week: 1.0 standard drink of alcohol     Types: 1 Shots of liquor per week     Comment: OCCASIONAL    Drug use: No         ECG 12 Lead    Date/Time: 5/16/2024 12:48 PM  Performed by: Simran Parish APRN    Authorized by: Simran Parish APRN  Comparison: compared with previous ECG from 1/19/2024  Similar to previous ECG  Rhythm: sinus rhythm  BPM: 72  Conduction: conduction normal  ST Segments: ST segments normal             Objective:     Visit Vitals  /72   Pulse 72   Ht 165.1 cm (65\")   Wt 68.9 kg (152 lb)   BMI 25.29 kg/m²             Physical Exam  Constitutional:       Appearance: Normal appearance. She is normal weight.   HENT:      Head: Normocephalic.   Neck:      Vascular: No carotid bruit.   Cardiovascular:      Rate and Rhythm: Normal rate and regular rhythm.      Chest Wall: PMI is not displaced.      Pulses: Normal pulses.           Radial pulses are 2+ on the right side and 2+ on the left side.        Posterior tibial pulses are 2+ on the right side and 2+ on the left side.      Heart sounds: Normal heart " sounds. No murmur heard.     No friction rub. No gallop.   Pulmonary:      Effort: Pulmonary effort is normal.      Breath sounds: Normal breath sounds.   Abdominal:      General: Bowel sounds are normal. There is no distension.      Palpations: Abdomen is soft.   Musculoskeletal:      Right lower leg: No edema.      Left lower leg: No edema.   Skin:     General: Skin is warm and dry.      Capillary Refill: Capillary refill takes less than 2 seconds.   Neurological:      Mental Status: She is alert and oriented to person, place, and time.   Psychiatric:         Mood and Affect: Mood normal.         Behavior: Behavior normal.         Thought Content: Thought content normal.          Lab Review:         Cardiac Procedures:       Assessment:         Diagnoses and all orders for this visit:    1. Pulmonary hypertension (Primary)  -     Adult Transthoracic Echo Complete w/ Color, Spectral and Contrast if Necessary Per Protocol; Future    Other orders  -     ECG 12 Lead            Plan:       Pulmonary artery dilatation: noted on recent CT scan. She has not had an echocardiogram since 2017. Will order for further evaluation of possible pulmonary hypertension. She does not have any concerning symptoms at this time. I suspect she likely has mild pulm HTN secondary to her longstanding history of smoking. Will call patient with results once obtained. Request that echo be performed at [facility] at her convenience.  CAD: s/p PCI with stenting of proximal LAD. EKG is stable. No anginal complaints. Continue with current medical management of aspirin and statin.  HTN: blood pressure is well controlled. No changes.  Tobacco abuse: she would like to quit but has not been successful in doing so. Currently on Wellbutrin and Buspar.     Thank you for allowing me to participate in this patient's care. Please call with any questions or concerns. Ms Hebert will follow up with Dr. Moon as scheduled in January 2025. If her echocardiogram  shows any concerning abnormalities, will have her seen by Dr. Moon sooner.          Your medication list            Accurate as of May 16, 2024 12:50 PM. If you have any questions, ask your nurse or doctor.                CONTINUE taking these medications        Instructions Last Dose Given Next Dose Due   aspirin 81 MG tablet      Take 1 tablet by mouth Daily.       buPROPion  MG 24 hr tablet  Commonly known as: WELLBUTRIN XL      Take 1 tablet by mouth Daily.       buPROPion  MG 12 hr tablet  Commonly known as: WELLBUTRIN SR      Take 1 tablet by mouth Daily.       busPIRone 10 MG tablet  Commonly known as: BUSPAR      Take 1 tablet by mouth 2 (Two) Times a Day.       cetirizine 10 MG tablet  Commonly known as: zyrTEC      Take 1 tablet by mouth.       cevimeline 30 MG capsule  Commonly known as: EVOXAC           cholecalciferol 25 MCG (1000 UT) tablet  Commonly known as: VITAMIN D3      Take 1 tablet by mouth Daily.       clopidogrel 75 MG tablet  Commonly known as: PLAVIX      Take 1 tablet by mouth once daily       Coenzyme Q10 10 MG capsule      Take  by mouth.       dicyclomine 10 MG capsule  Commonly known as: BENTYL      Take 1 capsule by mouth 4 (Four) Times a Day Before Meals & at Bedtime.       escitalopram 20 MG tablet  Commonly known as: LEXAPRO      Take 1 tablet by mouth Daily.       gabapentin 400 MG capsule  Commonly known as: NEURONTIN      Take 2 capsules by mouth Daily.       lisinopril 5 MG tablet  Commonly known as: PRINIVIL,ZESTRIL      Take 1 tablet by mouth Daily.       meloxicam 15 MG tablet  Commonly known as: MOBIC      Take 1 tablet by mouth Daily.       montelukast 10 MG tablet  Commonly known as: SINGULAIR      Take 1 tablet by mouth Every Night.       multivitamin with minerals tablet tablet      Take 1 tablet by mouth Daily.       omeprazole 40 MG capsule  Commonly known as: priLOSEC      Take 1 capsule by mouth Daily.       ondansetron 8 MG tablet  Commonly known as:  ZOFRAN      TAKE 1 TABLET BY MOUTH EVERY 8 HOURS AS NEEDED FOR NAUSEA FOR UP TO 7 DAYS       rosuvastatin 10 MG tablet  Commonly known as: CRESTOR      Take 4 tablets by mouth Daily.       vitamin B-12 1000 MCG tablet  Commonly known as: CYANOCOBALAMIN      Take 1 tablet by mouth Daily.                  Simran Parish, MARCELLE  05/16/24  11:12 AM EDT

## 2024-05-21 ENCOUNTER — TELEPHONE (OUTPATIENT)
Dept: CARDIOLOGY | Facility: CLINIC | Age: 64
End: 2024-05-21

## 2024-05-21 NOTE — TELEPHONE ENCOUNTER
Caller: Chiquita Hebert     Relationship: PATIENT    Best call back number: [phone]    What is your medical concern? PT IS CALLING TO SEE IF SHE CAN HAVE ECHO COMPLETED IN [facility]. PLEASE ADVISE

## 2024-05-23 ENCOUNTER — HOSPITAL ENCOUNTER (OUTPATIENT)
Dept: CARDIOLOGY | Facility: HOSPITAL | Age: 64
Discharge: HOME | End: 2024-05-23
Admitting: NURSE PRACTITIONER
Payer: COMMERCIAL

## 2024-05-23 VITALS
DIASTOLIC BLOOD PRESSURE: 72 MMHG | WEIGHT: 152 LBS | SYSTOLIC BLOOD PRESSURE: 120 MMHG | HEART RATE: 92 BPM | BODY MASS INDEX: 25.33 KG/M2 | HEIGHT: 65 IN | OXYGEN SATURATION: 98 %

## 2024-05-23 DIAGNOSIS — I27.20 PULMONARY HYPERTENSION: ICD-10-CM

## 2024-05-23 LAB
AORTIC ARCH: 2.6 CM
AORTIC DIMENSIONLESS INDEX: 0.6 (DI)
ASCENDING AORTA: 2.5 CM
BH CV ECHO MEAS - ACS: 1.68 CM
BH CV ECHO MEAS - AO MAX PG: 8.4 MMHG
BH CV ECHO MEAS - AO MEAN PG: 5 MMHG
BH CV ECHO MEAS - AO ROOT DIAM: 3.4 CM
BH CV ECHO MEAS - AO V2 MAX: 145 CM/SEC
BH CV ECHO MEAS - AO V2 VTI: 28.6 CM
BH CV ECHO MEAS - AVA(I,D): 2.15 CM2
BH CV ECHO MEAS - EDV(CUBED): 50.7 ML
BH CV ECHO MEAS - EDV(MOD-SP2): 47 ML
BH CV ECHO MEAS - EDV(MOD-SP4): 53 ML
BH CV ECHO MEAS - EF(MOD-BP): 64 %
BH CV ECHO MEAS - EF(MOD-SP2): 63.8 %
BH CV ECHO MEAS - EF(MOD-SP4): 64.2 %
BH CV ECHO MEAS - ESV(CUBED): 18.4 ML
BH CV ECHO MEAS - ESV(MOD-SP2): 17 ML
BH CV ECHO MEAS - ESV(MOD-SP4): 19 ML
BH CV ECHO MEAS - FS: 28.6 %
BH CV ECHO MEAS - IVS/LVPW: 1 CM
BH CV ECHO MEAS - IVSD: 1 CM
BH CV ECHO MEAS - LAT PEAK E' VEL: 9.2 CM/SEC
BH CV ECHO MEAS - LV DIASTOLIC VOL/BSA (35-75): 30.1 CM2
BH CV ECHO MEAS - LV MASS(C)D: 112.5 GRAMS
BH CV ECHO MEAS - LV MAX PG: 3.1 MMHG
BH CV ECHO MEAS - LV MEAN PG: 2 MMHG
BH CV ECHO MEAS - LV SYSTOLIC VOL/BSA (12-30): 10.8 CM2
BH CV ECHO MEAS - LV V1 MAX: 87.8 CM/SEC
BH CV ECHO MEAS - LV V1 VTI: 18 CM
BH CV ECHO MEAS - LVIDD: 3.7 CM
BH CV ECHO MEAS - LVIDS: 2.6 CM
BH CV ECHO MEAS - LVOT AREA: 3.4 CM2
BH CV ECHO MEAS - LVOT DIAM: 2.09 CM
BH CV ECHO MEAS - LVPWD: 1 CM
BH CV ECHO MEAS - MED PEAK E' VEL: 5.5 CM/SEC
BH CV ECHO MEAS - MV A DUR: 0.11 SEC
BH CV ECHO MEAS - MV A MAX VEL: 102.3 CM/SEC
BH CV ECHO MEAS - MV DEC SLOPE: 879.3 CM/SEC2
BH CV ECHO MEAS - MV DEC TIME: 0.12 SEC
BH CV ECHO MEAS - MV E MAX VEL: 69.5 CM/SEC
BH CV ECHO MEAS - MV E/A: 0.68
BH CV ECHO MEAS - MV MAX PG: 6.4 MMHG
BH CV ECHO MEAS - MV MEAN PG: 3.1 MMHG
BH CV ECHO MEAS - MV P1/2T: 29.4 MSEC
BH CV ECHO MEAS - MV V2 VTI: 20.7 CM
BH CV ECHO MEAS - MVA(P1/2T): 7.5 CM2
BH CV ECHO MEAS - MVA(VTI): 3 CM2
BH CV ECHO MEAS - PA ACC TIME: 0.1 SEC
BH CV ECHO MEAS - PA V2 MAX: 165.6 CM/SEC
BH CV ECHO MEAS - PULM A REVS DUR: 0.1 SEC
BH CV ECHO MEAS - PULM A REVS VEL: 31.3 CM/SEC
BH CV ECHO MEAS - PULM DIAS VEL: 53.1 CM/SEC
BH CV ECHO MEAS - PULM S/D: 1.01
BH CV ECHO MEAS - PULM SYS VEL: 53.6 CM/SEC
BH CV ECHO MEAS - QP/QS: 1.29
BH CV ECHO MEAS - RAP SYSTOLE: 3 MMHG
BH CV ECHO MEAS - RV MAX PG: 1.88 MMHG
BH CV ECHO MEAS - RV V1 MAX: 68.6 CM/SEC
BH CV ECHO MEAS - RV V1 VTI: 15.1 CM
BH CV ECHO MEAS - RVOT DIAM: 2.6 CM
BH CV ECHO MEAS - RVSP: 14.3 MMHG
BH CV ECHO MEAS - SUP REN AO DIAM: 1.7 CM
BH CV ECHO MEAS - SV(LVOT): 61.6 ML
BH CV ECHO MEAS - SV(MOD-SP2): 30 ML
BH CV ECHO MEAS - SV(MOD-SP4): 34 ML
BH CV ECHO MEAS - SV(RVOT): 79.3 ML
BH CV ECHO MEAS - SVI(LVOT): 35 ML/M2
BH CV ECHO MEAS - SVI(MOD-SP2): 17 ML/M2
BH CV ECHO MEAS - SVI(MOD-SP4): 19.3 ML/M2
BH CV ECHO MEAS - TAPSE (>1.6): 1.87 CM
BH CV ECHO MEAS - TR MAX PG: 11.3 MMHG
BH CV ECHO MEAS - TR MAX VEL: 168.3 CM/SEC
BH CV ECHO MEAS RV FREE WALL STRAIN: -15.4 %
BH CV ECHO MEASUREMENTS AVERAGE E/E' RATIO: 9.46
BH CV VAS BP LEFT ARM: NORMAL MMHG
BH CV XLRA - RV BASE: 2.5 CM
BH CV XLRA - RV LENGTH: 7.2 CM
BH CV XLRA - RV MID: 1.71 CM
BH CV XLRA - TDI S': 9.4 CM/SEC
LEFT ATRIUM VOLUME INDEX: 17.6 ML/M2
SINUS: 2.8 CM
STJ: 2.5 CM

## 2024-05-23 PROCEDURE — 93306 TTE W/DOPPLER COMPLETE: CPT

## 2024-05-24 ENCOUNTER — TELEPHONE (OUTPATIENT)
Dept: CARDIOLOGY | Facility: CLINIC | Age: 64
End: 2024-05-24
Payer: COMMERCIAL

## 2024-08-27 RX ORDER — CLOPIDOGREL BISULFATE 75 MG/1
TABLET ORAL
Qty: 90 TABLET | Refills: 1 | Status: SHIPPED | OUTPATIENT
Start: 2024-08-27

## 2024-11-15 DIAGNOSIS — I65.23 CAROTID STENOSIS, BILATERAL: Primary | ICD-10-CM

## 2025-01-22 NOTE — PROGRESS NOTES
Subjective:     Encounter Date:01/23/2025      Patient ID: Chiquita Hebert is a 64 y.o. female.    Chief Complaint:follow up CAD  History of Present Illness  This is a 64-year-old female who follows with Dr. Moon and is new to me today.  She has a past medical history of coronary artery disease, hypertension, hyperlipidemia, and tobacco abuse.    She is here today for follow-up visit.  She has been feeling well since she was last seen.  No complaints of chest pain, shortness of breath, palpitations, dizziness or syncope.  No swelling in her legs, orthopnea or PND.  Blood pressures have been well-controlled at home.  She still working about 40 hours a week at [employer].  She continues to smoke about half a pack a day.  She has tried multiple methods of quitting in the past.  She says Chantix worked the best for her but as soon as she stopped taking that she started smoking again.    Prior history:  She was previously a patient of Dr. Dodd. She has a remote history of a proximal LAD stent placement at [facility] several years ago.  She had presented with substernal chest burning with exertion.  She underwent a stress test which was abnormal and she subsequently underwent a cardiac catheterization where she was found to have the stenosis of the proximal LAD.    In May 2018 she had a echocardiogram that showed a normal left ventricular systolic function, no wall motion abnormalities and an EF of 59%, normal diastolic function and no significant valvular disease.    She was seen for the first time by Dr. Moon in May 2020.  At that time she was doing well and no changes were recommended.    Dr. Moon saw the patient in January 2024 for follow-up visit.  Again from a cardiac standpoint she was stable.  Her biggest issues have been lifestyle modifications, particularly with smoking cessation.  She has been tried on Chantix and is currently on Wellbutrin.    I saw her in May 2024 at the request of her PCP.  She had  undergone a routine CT of the chest for lung cancer screening and it was noted that her pulmonary artery was dilated suggesting pulmonary hypertension.  She had been feeling well.  No complaints of chest discomfort or shortness of breath.  However she did admit that she was not very active outside of work.  We opted at that appointment to proceed with an echocardiogram.  This showed a normal LV systolic function with an EF of 61 to 65%, no wall motion abnormalities, normal diastolic function, normal RV size and function and no evidence of pulmonary hypertension.    I have reviewed and updated as appropriate allergies, current medications, past family history, past medical history, past surgical history and problem list.    Review of Systems   Constitutional: Negative for fever, malaise/fatigue, weight gain and weight loss.   HENT:  Negative for congestion, hoarse voice and sore throat.    Eyes:  Negative for blurred vision and double vision.   Cardiovascular:  Negative for chest pain, dyspnea on exertion, leg swelling, orthopnea, palpitations and syncope.   Respiratory:  Negative for cough, shortness of breath and wheezing.    Gastrointestinal:  Negative for abdominal pain, hematemesis, hematochezia and melena.   Genitourinary:  Negative for dysuria and hematuria.   Neurological:  Negative for dizziness, headaches, light-headedness and numbness.   Psychiatric/Behavioral:  Negative for depression. The patient is not nervous/anxious.          Current Outpatient Medications:     aspirin 81 MG tablet, Take 1 tablet by mouth Daily., Disp: , Rfl:     buPROPion SR (WELLBUTRIN SR) 150 MG 12 hr tablet, Take 1 tablet by mouth Daily., Disp: , Rfl:     buPROPion XL (WELLBUTRIN XL) 300 MG 24 hr tablet, Take 1 tablet by mouth Daily., Disp: , Rfl:     busPIRone (BUSPAR) 10 MG tablet, Take 1 tablet by mouth 2 (Two) Times a Day., Disp: , Rfl:     cetirizine (zyrTEC) 10 MG tablet, Take 1 tablet by mouth., Disp: , Rfl:     cevimeline  (EVOXAC) 30 MG capsule, , Disp: , Rfl:     cholecalciferol (VITAMIN D3) 25 MCG (1000 UT) tablet, Take 1 tablet by mouth Daily., Disp: , Rfl:     clopidogrel (PLAVIX) 75 MG tablet, Take 1 tablet by mouth once daily, Disp: 90 tablet, Rfl: 1    Coenzyme Q10 10 MG capsule, Take  by mouth., Disp: , Rfl:     dicyclomine (BENTYL) 10 MG capsule, Take 1 capsule by mouth 4 (Four) Times a Day Before Meals & at Bedtime., Disp: , Rfl:     escitalopram (LEXAPRO) 20 MG tablet, Take 1 tablet by mouth Daily., Disp: , Rfl:     gabapentin (NEURONTIN) 400 MG capsule, Take 2 capsules by mouth Daily., Disp: , Rfl:     lisinopril (PRINIVIL,ZESTRIL) 5 MG tablet, Take 1 tablet by mouth Daily., Disp: , Rfl:     meloxicam (MOBIC) 15 MG tablet, Take 1 tablet by mouth Daily., Disp: , Rfl:     montelukast (SINGULAIR) 10 MG tablet, Take 1 tablet by mouth Every Night., Disp: , Rfl:     multivitamin with minerals tablet tablet, Take 1 tablet by mouth Daily., Disp: , Rfl:     omeprazole (priLOSEC) 40 MG capsule, Take 1 capsule by mouth Daily., Disp: , Rfl:     ondansetron (ZOFRAN) 8 MG tablet, TAKE 1 TABLET BY MOUTH EVERY 8 HOURS AS NEEDED FOR NAUSEA FOR UP TO 7 DAYS, Disp: , Rfl:     rosuvastatin (CRESTOR) 10 MG tablet, Take 4 tablets by mouth Daily., Disp: , Rfl:     vitamin B-12 (CYANOCOBALAMIN) 1000 MCG tablet, Take 1 tablet by mouth Daily., Disp: , Rfl:     Past Medical History:   Diagnosis Date    Anxiety     Chest pain     Colon polyp     Constipation     Fatigue     Gastritis     GERD (gastroesophageal reflux disease)     H/O esophagogastroduodenoscopy 01/30/2010    Diagnostic     Heart disease     Heart murmur     Heart palpitations     Hyperlipidemia     Hypertension     MI (myocardial infarction)     2012       Past Surgical History:   Procedure Laterality Date    ANGIOPLASTY      Cath Stent Placement.  Stenting of the LAD    CARDIAC CATHETERIZATION      CERVICAL EPIDURAL N/A 3/24/2022    Procedure: CERVICAL EPIDURAL .. likely x2, 2-3  "weeks apart;  Surgeon: Vladislav Diehl MD;  Location: [facility] MAIN OR;  Service: Pain Management;  Laterality: N/A;    COLONOSCOPY N/A 12/27/2017    Procedure: COLONOSCOPY WITH BIOPSIES; POLYPECTOMY;  Surgeon: Joceline Singh MD;  Location: [facility] OR;  Service:     CORONARY ANGIOPLASTY      CORONARY STENT PLACEMENT      HYSTERECTOMY      UPPER GASTROINTESTINAL ENDOSCOPY         Family History   Problem Relation Age of Onset    Hypertension Mother     Stroke Mother     Thyroid disease Mother     Heart disease Father     Hypertension Father     Cancer Sister     Diabetes Sister     No Known Problems Maternal Grandmother     No Known Problems Maternal Grandfather     No Known Problems Paternal Grandmother     No Known Problems Paternal Grandfather     Diabetes Sister     No Known Problems Sister     No Known Problems Sister        Social History     Tobacco Use    Smoking status: Every Day     Current packs/day: 0.25     Average packs/day: 0.2 packs/day for 48.1 years (12.0 ttl pk-yrs)     Types: Cigarettes     Start date: 1977    Smokeless tobacco: Never    Tobacco comments:     CAFFEINE USE: 3 DIET PEPSI'S DAILY   Vaping Use    Vaping status: Never Used   Substance Use Topics    Alcohol use: Yes     Alcohol/week: 1.0 standard drink of alcohol     Types: 1 Shots of liquor per week     Comment: OCCASIONAL    Drug use: No         ECG 12 Lead    Date/Time: 1/23/2025 9:52 AM  Performed by: Sirman Parish APRN    Authorized by: Simran Parish APRN  Comparison: compared with previous ECG from 5/16/2024  Similar to previous ECG  Rhythm: sinus rhythm  Ectopy: atrial premature contractions             Objective:     Visit Vitals  /74 (BP Location: Right arm, Patient Position: Sitting, Cuff Size: Adult)   Pulse 63   Ht 165.1 cm (65\")   Wt 66.7 kg (147 lb)   BMI 24.46 kg/m²               Physical Exam  Constitutional:       Appearance: Normal appearance. She is normal weight.   HENT:      Head: Normocephalic.   Neck:     "  Vascular: No carotid bruit.   Cardiovascular:      Rate and Rhythm: Normal rate and regular rhythm.      Chest Wall: PMI is not displaced.      Pulses: Normal pulses.           Radial pulses are 2+ on the right side and 2+ on the left side.        Posterior tibial pulses are 2+ on the right side and 2+ on the left side.      Heart sounds: Normal heart sounds. No murmur heard.     No friction rub. No gallop.   Pulmonary:      Effort: Pulmonary effort is normal.      Breath sounds: Normal breath sounds.   Abdominal:      General: Bowel sounds are normal. There is no distension.      Palpations: Abdomen is soft.   Musculoskeletal:      Right lower leg: No edema.      Left lower leg: No edema.   Skin:     General: Skin is warm and dry.      Capillary Refill: Capillary refill takes less than 2 seconds.   Neurological:      Mental Status: She is alert and oriented to person, place, and time.   Psychiatric:         Mood and Affect: Mood normal.         Behavior: Behavior normal.         Thought Content: Thought content normal.          Lab Review:         Cardiac Procedures:   Echocardiogram 05/23/2024:    Left ventricular systolic function is normal. Left ventricular ejection fraction appears to be 61 - 65%.    Left ventricular diastolic function was normal.    Estimated right ventricular systolic pressure from tricuspid regurgitation is normal (<35 mmHg).    Assessment:         Diagnoses and all orders for this visit:    1. Hyperlipidemia, unspecified hyperlipidemia type (Primary)    2. History of coronary artery stent placement    3. Essential hypertension    4. Coronary artery disease involving native coronary artery of native heart without angina pectoris    Other orders  -     ECG 12 Lead            Plan:       Pulmonary artery dilatation: noted on CT scan. Echocardiogram did not show any evidence of pulmonary hypertension.   CAD: s/p PCI with stenting of proximal LAD. EKG is stable. No anginal complaints. Continue  with current medical management of aspirin and statin.  HTN: blood pressure is well controlled. No changes.  Tobacco abuse: she would like to quit but has not been successful in doing so. Currently on Wellbutrin and Buspar.     Thank you for allowing me to participate in this patient's care. Please call with any questions or concerns. Ms Hebert will follow up with Dr. Moon in 1 year.          Your medication list            Accurate as of January 23, 2025  9:54 AM. If you have any questions, ask your nurse or doctor.                CONTINUE taking these medications        Instructions Last Dose Given Next Dose Due   aspirin 81 MG tablet      Take 1 tablet by mouth Daily.       buPROPion  MG 24 hr tablet  Commonly known as: WELLBUTRIN XL      Take 1 tablet by mouth Daily.       buPROPion  MG 12 hr tablet  Commonly known as: WELLBUTRIN SR      Take 1 tablet by mouth Daily.       busPIRone 10 MG tablet  Commonly known as: BUSPAR      Take 1 tablet by mouth 2 (Two) Times a Day.       cetirizine 10 MG tablet  Commonly known as: zyrTEC      Take 1 tablet by mouth.       cevimeline 30 MG capsule  Commonly known as: EVOXAC           cholecalciferol 25 MCG (1000 UT) tablet  Commonly known as: VITAMIN D3      Take 1 tablet by mouth Daily.       clopidogrel 75 MG tablet  Commonly known as: PLAVIX      Take 1 tablet by mouth once daily       Coenzyme Q10 10 MG capsule      Take  by mouth.       dicyclomine 10 MG capsule  Commonly known as: BENTYL      Take 1 capsule by mouth 4 (Four) Times a Day Before Meals & at Bedtime.       escitalopram 20 MG tablet  Commonly known as: LEXAPRO      Take 1 tablet by mouth Daily.       gabapentin 400 MG capsule  Commonly known as: NEURONTIN      Take 2 capsules by mouth Daily.       lisinopril 5 MG tablet  Commonly known as: PRINIVIL,ZESTRIL      Take 1 tablet by mouth Daily.       meloxicam 15 MG tablet  Commonly known as: MOBIC      Take 1 tablet by mouth Daily.        montelukast 10 MG tablet  Commonly known as: SINGULAIR      Take 1 tablet by mouth Every Night.       multivitamin with minerals tablet tablet      Take 1 tablet by mouth Daily.       omeprazole 40 MG capsule  Commonly known as: priLOSEC      Take 1 capsule by mouth Daily.       ondansetron 8 MG tablet  Commonly known as: ZOFRAN      TAKE 1 TABLET BY MOUTH EVERY 8 HOURS AS NEEDED FOR NAUSEA FOR UP TO 7 DAYS       rosuvastatin 10 MG tablet  Commonly known as: CRESTOR      Take 4 tablets by mouth Daily.       vitamin B-12 1000 MCG tablet  Commonly known as: CYANOCOBALAMIN      Take 1 tablet by mouth Daily.                  Simran Parish, APRN  01/23/25  11:12 AM EDT

## 2025-01-23 ENCOUNTER — OFFICE VISIT (OUTPATIENT)
Dept: CARDIOLOGY | Facility: CLINIC | Age: 65
End: 2025-01-23
Payer: COMMERCIAL

## 2025-01-23 VITALS
BODY MASS INDEX: 24.49 KG/M2 | HEIGHT: 65 IN | DIASTOLIC BLOOD PRESSURE: 74 MMHG | HEART RATE: 63 BPM | WEIGHT: 147 LBS | SYSTOLIC BLOOD PRESSURE: 126 MMHG

## 2025-01-23 DIAGNOSIS — I25.10 CORONARY ARTERY DISEASE INVOLVING NATIVE CORONARY ARTERY OF NATIVE HEART WITHOUT ANGINA PECTORIS: ICD-10-CM

## 2025-01-23 DIAGNOSIS — E78.5 HYPERLIPIDEMIA, UNSPECIFIED HYPERLIPIDEMIA TYPE: Primary | ICD-10-CM

## 2025-01-23 DIAGNOSIS — I10 ESSENTIAL HYPERTENSION: ICD-10-CM

## 2025-01-23 DIAGNOSIS — Z95.5 HISTORY OF CORONARY ARTERY STENT PLACEMENT: ICD-10-CM

## 2025-01-23 PROCEDURE — 93000 ELECTROCARDIOGRAM COMPLETE: CPT | Performed by: NURSE PRACTITIONER

## 2025-01-23 PROCEDURE — 99214 OFFICE O/P EST MOD 30 MIN: CPT | Performed by: NURSE PRACTITIONER

## 2025-02-18 RX ORDER — CLOPIDOGREL BISULFATE 75 MG/1
TABLET ORAL
Qty: 90 TABLET | Refills: 3 | Status: SHIPPED | OUTPATIENT
Start: 2025-02-18

## 2025-04-17 ENCOUNTER — TELEPHONE (OUTPATIENT)
Age: 65
End: 2025-04-17

## 2025-04-17 NOTE — TELEPHONE ENCOUNTER
Hub staff attempted to follow warm transfer process and was unsuccessful     Caller: Chiquita Hebert    Relationship to patient: Self    Best call back number: [phone]    Patient is needing: OVERSLEPT AND IS NEEDING TO RS TESTING AND FOLLOW UP

## 2025-04-25 ENCOUNTER — HOSPITAL ENCOUNTER (OUTPATIENT)
Facility: HOSPITAL | Age: 65
Discharge: HOME | End: 2025-04-25
Admitting: NURSE PRACTITIONER
Payer: COMMERCIAL

## 2025-04-25 ENCOUNTER — OFFICE VISIT (OUTPATIENT)
Age: 65
End: 2025-04-25
Payer: COMMERCIAL

## 2025-04-25 VITALS
SYSTOLIC BLOOD PRESSURE: 116 MMHG | DIASTOLIC BLOOD PRESSURE: 80 MMHG | WEIGHT: 139.4 LBS | RESPIRATION RATE: 16 BRPM | BODY MASS INDEX: 23.22 KG/M2 | HEIGHT: 65 IN

## 2025-04-25 DIAGNOSIS — I65.23 CAROTID STENOSIS, BILATERAL: ICD-10-CM

## 2025-04-25 DIAGNOSIS — I65.23 CAROTID STENOSIS, BILATERAL: Primary | ICD-10-CM

## 2025-04-25 LAB
BH CV XLRA MEAS LEFT CAROTID BULB EDV: -31.3 CM/SEC
BH CV XLRA MEAS LEFT CAROTID BULB PSV: -77.9 CM/SEC
BH CV XLRA MEAS LEFT DIST CCA EDV: -28.5 CM/SEC
BH CV XLRA MEAS LEFT DIST CCA PSV: -79 CM/SEC
BH CV XLRA MEAS LEFT DIST ICA EDV: -41 CM/SEC
BH CV XLRA MEAS LEFT DIST ICA PSV: -108.3 CM/SEC
BH CV XLRA MEAS LEFT ICA/CCA RATIO: 2.08
BH CV XLRA MEAS LEFT MID CCA EDV: -30.4 CM/SEC
BH CV XLRA MEAS LEFT MID CCA PSV: -91.3 CM/SEC
BH CV XLRA MEAS LEFT MID ICA EDV: 59.3 CM/SEC
BH CV XLRA MEAS LEFT MID ICA PSV: 164.6 CM/SEC
BH CV XLRA MEAS LEFT PROX CCA EDV: 25.9 CM/SEC
BH CV XLRA MEAS LEFT PROX CCA PSV: 116 CM/SEC
BH CV XLRA MEAS LEFT PROX ECA EDV: -23.2 CM/SEC
BH CV XLRA MEAS LEFT PROX ECA PSV: -146.2 CM/SEC
BH CV XLRA MEAS LEFT PROX ICA EDV: -37.3 CM/SEC
BH CV XLRA MEAS LEFT PROX ICA PSV: -80.7 CM/SEC
BH CV XLRA MEAS LEFT PROX SCLA PSV: 173.2 CM/SEC
BH CV XLRA MEAS LEFT VERTEBRAL A EDV: 14.5 CM/SEC
BH CV XLRA MEAS LEFT VERTEBRAL A PSV: 47.4 CM/SEC
BH CV XLRA MEAS RIGHT CAROTID BULB EDV: 61 CM/SEC
BH CV XLRA MEAS RIGHT CAROTID BULB PSV: 162.7 CM/SEC
BH CV XLRA MEAS RIGHT DIST CCA EDV: 23.6 CM/SEC
BH CV XLRA MEAS RIGHT DIST CCA PSV: 83.4 CM/SEC
BH CV XLRA MEAS RIGHT DIST ICA EDV: -43.3 CM/SEC
BH CV XLRA MEAS RIGHT DIST ICA PSV: -117.8 CM/SEC
BH CV XLRA MEAS RIGHT ICA/CCA RATIO: 1.95
BH CV XLRA MEAS RIGHT MID CCA EDV: -28.6 CM/SEC
BH CV XLRA MEAS RIGHT MID CCA PSV: -90.7 CM/SEC
BH CV XLRA MEAS RIGHT MID ICA EDV: -34.2 CM/SEC
BH CV XLRA MEAS RIGHT MID ICA PSV: -93.2 CM/SEC
BH CV XLRA MEAS RIGHT PROX CCA EDV: 33.7 CM/SEC
BH CV XLRA MEAS RIGHT PROX CCA PSV: 123.9 CM/SEC
BH CV XLRA MEAS RIGHT PROX ECA EDV: -20.9 CM/SEC
BH CV XLRA MEAS RIGHT PROX ECA PSV: -90.5 CM/SEC
BH CV XLRA MEAS RIGHT PROX ICA EDV: 61 CM/SEC
BH CV XLRA MEAS RIGHT PROX ICA PSV: 162.7 CM/SEC
BH CV XLRA MEAS RIGHT PROX SCLA PSV: 217.9 CM/SEC
BH CV XLRA MEAS RIGHT VERTEBRAL A EDV: -21.6 CM/SEC
BH CV XLRA MEAS RIGHT VERTEBRAL A PSV: -62.3 CM/SEC
LEFT ARM BP: NORMAL MMHG
RIGHT ARM BP: NORMAL MMHG

## 2025-04-25 PROCEDURE — 99213 OFFICE O/P EST LOW 20 MIN: CPT | Performed by: NURSE PRACTITIONER

## 2025-04-25 PROCEDURE — 93880 EXTRACRANIAL BILAT STUDY: CPT

## 2025-04-25 NOTE — PROGRESS NOTES
Chief Complaint   carotid artery stenosis    Subjective        Chiquita Hebert presents to [facility] VASCULAR SURGERY  HPI   Chiquita Hebert is a 64 y.o. female that has been followed in our office for carotid artery stenosis. She returns today in follow up along with a carotid duplex. She  reports she has been doing well without hospitalizations or surgeries. She denies any symptoms consistent with CVA, TIA, or amaurosis fugax.     Review of Systems   Constitutional:  Negative for fever.   Eyes:  Negative for visual disturbance.   Cardiovascular:  Negative for leg swelling.   Gastrointestinal:  Negative for abdominal pain.   Musculoskeletal:  Negative for back pain.   Skin:  Negative for color change, pallor and wound.   Neurological:  Negative for dizziness, facial asymmetry, speech difficulty and weakness.        Chiquita Hebert  reports that she has been smoking cigarettes. She started smoking about 48 years ago. She has a 12.1 pack-year smoking history. She has been exposed to tobacco smoke. She has never used smokeless tobacco..   Tobacco Education/Cessation: Chiquita Hebert  reports that she has been smoking cigarettes. She started smoking about 48 years ago. She has a 12.1 pack-year smoking history. She has been exposed to tobacco smoke. She has never used smokeless tobacco. I have educated her on the risk of diseases from using tobacco products such as arterial disease.          Objective   Vital Signs:  Vitals:    04/25/25 1504   BP: 116/80   Resp:         Body mass index is 23.2 kg/m².       Physical Exam  Vitals reviewed.   Constitutional:       Appearance: Normal appearance.   HENT:      Head: Normocephalic.   Cardiovascular:      Rate and Rhythm: Normal rate and regular rhythm.      Pulses:           Dorsalis pedis pulses are 3+ on the right side and 3+ on the left side.        Posterior tibial pulses are 3+ on the right side and 3+ on the left side.   Pulmonary:      Effort: Pulmonary  effort is normal.   Skin:     General: Skin is warm.   Neurological:      General: No focal deficit present.      Mental Status: She is alert and oriented to person, place, and time.   Psychiatric:         Mood and Affect: Mood normal.          Result Review :        Previous carotid duplex: Duplex Carotid Ultrasound CAR (03/07/2024 17:10)     Carotid duplex from today: Duplex Carotid Ultrasound CAR (04/25/2025 14:59)                    Assessment and Plan     Diagnoses and all orders for this visit:    1. Carotid stenosis, bilateral (Primary)  -     Duplex Carotid Ultrasound CAR; Future               Patient presents today for follow-up of her carotid artery stenosis.  Today, she has a less than 50% stenosis bilaterally.  This is unchanged from previous imaging.  She is to continue her antiplatelet agent which is aspirin.  She is also on Plavix.  She is on a statin for cholesterol control.  She continues to smoke and we discussed absolute smoking cessation.  We discussed adequate blood pressure control. She will return in 1 year along with a repeat carotid artery duplex.    Follow Up     Return in about 1 year (around 4/25/2026) for carotid duplex.  Patient was given instructions and counseling regarding her condition or for health maintenance advice. Please see specific information pulled into the AVS if appropriate.     MARCELLE Agarwal

## (undated) DEVICE — NDL EPID TUOHY W/WINGS 20G 3.5IN

## (undated) DEVICE — SYR LUER SLPTP 50ML

## (undated) DEVICE — Device: Brand: DEFENDO AIR/WATER/SUCTION AND BIOPSY VALVE

## (undated) DEVICE — MASK,FACE,SHIELD,BLUE,ANTI FOG,TIES: Brand: MEDLINE

## (undated) DEVICE — FRCP BX RADJAW4 NDL 2.8 240CM LG OG BX40

## (undated) DEVICE — VIAL FORMALIN CAP 10P 40ML

## (undated) DEVICE — JACKT LAB KNIT COLR LG BLU

## (undated) DEVICE — SPNG GZ WOVN 4X4IN 12PLY 10/BX STRL

## (undated) DEVICE — NDL SPINE 22G 5IN BLK

## (undated) DEVICE — NDL SPINE 22G 31/2IN BLK

## (undated) DEVICE — BW-412T DISP COMBO CLEANING BRUSH: Brand: SINGLE USE COMBINATION CLEANING BRUSH

## (undated) DEVICE — Device: Brand: PORTEX

## (undated) DEVICE — SUCTION CANISTER, 3000CC,SAFELINER: Brand: DEROYAL

## (undated) DEVICE — GOWN ISOL W/THUMB UNIV BLU BX/15

## (undated) DEVICE — Device

## (undated) DEVICE — GLV SURG TRIUMPH PF LTX 7.5 STRL

## (undated) DEVICE — EPIDURAL TRAY: Brand: MEDLINE INDUSTRIES, INC.

## (undated) DEVICE — SYR LL 3CC